# Patient Record
Sex: MALE | Race: WHITE | URBAN - METROPOLITAN AREA
[De-identification: names, ages, dates, MRNs, and addresses within clinical notes are randomized per-mention and may not be internally consistent; named-entity substitution may affect disease eponyms.]

---

## 2018-03-01 ENCOUNTER — OFFICE VISIT (OUTPATIENT)
Dept: PSYCHIATRY | Facility: CLINIC | Age: 23
End: 2018-03-01
Attending: PSYCHOLOGIST
Payer: COMMERCIAL

## 2018-03-01 DIAGNOSIS — F41.0 PANIC DISORDER WITHOUT AGORAPHOBIA: Primary | ICD-10-CM

## 2018-03-01 DIAGNOSIS — F33.0 MAJOR DEPRESSIVE DISORDER, RECURRENT EPISODE, MILD (H): ICD-10-CM
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
## 2018-03-01 NOTE — MR AVS SNAPSHOT
After Visit Summary   3/1/2018    Shar Atkinson    MRN: [id number]           Patient Information     Date Of Birth          1995        Visit Information        Provider Department      3/1/2018 11:00 AM Sara Wong, PhD [facility]        Today's Diagnoses     Panic disorder without agoraphobia    -  1    Major depressive disorder, recurrent episode, mild (H)           Follow-ups after your visit        Your next 10 appointments already scheduled     Mar 08, 2018  3:00 PM CST   Adult Psychotherapy with Sara Byers, PhD   Psychiatry Clinic ([facility])    [address]  [address] [facility] [address] [phone]              Who to contact     Please call your clinic at [phone] to:    Ask questions about your health    Make or cancel appointments    Discuss your medicines    Learn about your test results    Speak to your doctor            Additional Information About Your Visit        MyChart Information     [employer] is an electronic gateway that provides easy, online access to your medical records. With [employer], you can request a clinic appointment, read your test results, renew a prescription or communicate with your care team.     To sign up for [employer] visit the website at [email or web address]   You will be asked to enter the access code listed below, as well as some personal information. Please follow the directions to create your username and password.     Your access code is: [email or web address]  Expires: 2018 12:23 PM     Your access code will  in 90 days. If you need help or a new code, please contact your [facility] or call [phone] for assistance.        Care EveryWhere ID     This is your Care EveryWhere ID. This could be used by other organizations to access your [facility] medical records  [id number]         Blood Pressure from Last 3 Encounters:   No data  found for BP    Weight from Last 3 Encounters:   No data found for Wt              Today, you had the following     No orders found for display       Primary Care Provider Office Phone # Fax #    Robert D Behrend, [phone]       [facility] [address]  [place] [address]        Equal Access to Services     [facility]: Hadii aad ku hadasho Soomaali, waaxda luqadaha, qaybta kaalmada adeegyada, waxay idiin hayaan adeeg kharash la'aan . So [facility] [phone].    ATENCIÓN: Si habla español, tiene a cedeno disposición servicios gratuitos de asistencia lingüística. Llame al [phone].    We comply with applicable federal civil rights laws and [place] laws. We do not discriminate on the basis of race, color, national origin, age, disability, sex, sexual orientation, or gender identity.            Thank you!     Thank you for choosing PS[facility] CLINIC  for your care. Our goal is always to provide you with excellent care. Hearing back from our patients is one way we can continue to improve our services. Please take a few minutes to complete the written survey that you may receive in the mail after your visit with us. Thank you!             Your Updated Medication List - Protect others around you: Learn how to safely use, store and throw away your medicines at www.disposemymeds.org.      Notice  As of 3/1/2018 12:23 PM    You have not been prescribed any medications.

## 2018-03-22 ENCOUNTER — OFFICE VISIT (OUTPATIENT)
Dept: PSYCHIATRY | Facility: CLINIC | Age: 23
End: 2018-03-22
Attending: NURSE PRACTITIONER
Payer: COMMERCIAL

## 2018-03-22 VITALS — SYSTOLIC BLOOD PRESSURE: 121 MMHG | WEIGHT: 157.6 LBS | HEART RATE: 68 BPM | DIASTOLIC BLOOD PRESSURE: 70 MMHG

## 2018-03-22 DIAGNOSIS — F41.1 GAD (GENERALIZED ANXIETY DISORDER): Primary | ICD-10-CM

## 2018-03-22 DIAGNOSIS — F33.1 MAJOR DEPRESSIVE DISORDER, RECURRENT EPISODE, MODERATE (H): ICD-10-CM

## 2018-03-22 PROCEDURE — G0463 HOSPITAL OUTPT CLINIC VISIT: HCPCS | Mod: ZF

## 2018-03-22 RX ORDER — PROPRANOLOL HYDROCHLORIDE 10 MG/1
TABLET ORAL
Qty: 90 TABLET | Refills: 0 | Status: SHIPPED | OUTPATIENT
Start: 2018-03-22 | End: 2018-04-24

## 2018-03-22 RX ORDER — ALBUTEROL SULFATE 90 UG/1
AEROSOL, METERED RESPIRATORY (INHALATION)
Refills: 0 | COMMUNITY
Start: 2018-03-22

## 2018-03-22 ASSESSMENT — PAIN SCALES - GENERAL: PAINLEVEL: NO PAIN (0)
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
## 2018-03-22 NOTE — NURSING NOTE
Chief Complaint   Patient presents with     Eval/Assessment     Panic disorder without agoraphobia      Reviewed allergies, smoking status, and pharmacy preference  Administered abuse screening questions   Obtained weight, blood pressure and heart rate

## 2018-03-22 NOTE — MR AVS SNAPSHOT
After Visit Summary   3/22/2018    Shar Atkinson    MRN: [id number]           Patient Information     Date Of Birth          1995        Visit Information        Provider Department      3/22/2018 9:30 AM Diego Parikh APRN CNP Psychiatry Clinic        Today's Diagnoses     IESHA (generalized anxiety disorder)    -  1    Major depressive disorder, recurrent episode, moderate (H)           Follow-ups after your visit        Your next 10 appointments already scheduled     Mar 28, 2018  4:00 PM CDT   Adult Psychotherapy with Sara Byers, PhD   Psychiatry Clinic ([facility])    [place] [id number] [address] [phone]            2018 10:00 AM CDT   Adult Med Follow UP with JOMAR Willis CNP   Psychiatry Clinic ([facility])    [place] [id number] [address] [phone]              Who to contact     Please call your clinic at [phone] to:    Ask questions about your health    Make or cancel appointments    Discuss your medicines    Learn about your test results    Speak to your doctor            Additional Information About Your Visit        MyChart Information     [employer] is an electronic gateway that provides easy, online access to your medical records. With evidanza, you can request a clinic appointment, read your test results, renew a prescription or communicate with your care team.     To sign up for [employer] visit the website at [email or web address]   You will be asked to enter the access code listed below, as well as some personal information. Please follow the directions to create your username and password.     Your access code is: [email or web address]  Expires: 2018  1:23 PM     Your access code will  in 90 days. If you need help or a new code, please contact your [facility] or call  [phone] for assistance.        Care EveryWhere ID     This is your Care EveryWhere ID. This could be used by other organizations to access your [facility] medical records  [id number]        Your Vitals Were     Pulse                   68            Blood Pressure from Last 3 Encounters:   03/22/18 121/70    Weight from Last 3 Encounters:   03/22/18 71.5 kg (157 lb 9.6 oz)              Today, you had the following     No orders found for display         Today's Medication Changes          These changes are accurate as of 3/22/18 11:59 PM.  If you have any questions, ask your nurse or doctor.               Start taking these medicines.        Dose/Directions    propranolol 10 MG tablet   Commonly known as:  INDERAL   Used for:  IESHA (generalized anxiety disorder)   Started by:  Diego Parikh APRN CNP        Take 1 tablet (10mg) up to three times per day as needed for performance anxiety   Quantity:  90 tablet   Refills:  0       sertraline 50 MG tablet   Commonly known as:  ZOLOFT   Used for:  Major depressive disorder, recurrent episode, moderate (H)   Started by:  Diego Parikh APRN CNP        Take 1/2 tablet (25mg) for 4-7 days then increase to 1 tablet (50mg) daily   Quantity:  30 tablet   Refills:  0            Where to get your medicines      These medications were sent to [facility]/pharmacy #[id number] - [place], [place]  [address] [facility]  [address] [place] [address]     Phone:  [phone]     propranolol 10 MG tablet    sertraline 50 MG tablet                Primary Care Provider Office Phone # Fax #    Robert D Behrend, [phone]       [facility] [address]  [place] [address]        Equal Access to Services     : Hadii jayleen Pang, waaxda ludeloris, qaybta kaaljostin burnett, med evans. So [facility] [phone].    ATENCIÓN: Si habla español, tiene a cedeno disposición servicios gratuitos  de asistencia lingüística. Rohit stein [phone].    We comply with applicable federal civil rights laws and [place] laws. We do not discriminate on the basis of race, color, national origin, age, disability, sex, sexual orientation, or gender identity.            Thank you!     Thank you for choosing PSYCHIATRY CLINIC  for your care. Our goal is always to provide you with excellent care. Hearing back from our patients is one way we can continue to improve our services. Please take a few minutes to complete the written survey that you may receive in the mail after your visit with us. Thank you!             Your Updated Medication List - Protect others around you: Learn how to safely use, store and throw away your medicines at www.disposemymeds.org.          This list is accurate as of 3/22/18 11:59 PM.  Always use your most recent med list.                   Brand Name Dispense Instructions for use Diagnosis    albuterol 108 (90 BASE) MCG/ACT Inhaler    PROAIR HFA/PROVENTIL HFA/VENTOLIN HFA          CONCERTA PO      Take 30 mg by mouth        propranolol 10 MG tablet    INDERAL    90 tablet    Take 1 tablet (10mg) up to three times per day as needed for performance anxiety    IESHA (generalized anxiety disorder)       sertraline 50 MG tablet    ZOLOFT    30 tablet    Take 1/2 tablet (25mg) for 4-7 days then increase to 1 tablet (50mg) daily    Major depressive disorder, recurrent episode, moderate (H)

## 2018-03-28 ENCOUNTER — OFFICE VISIT (OUTPATIENT)
Dept: PSYCHIATRY | Facility: CLINIC | Age: 23
End: 2018-03-28
Attending: PSYCHOLOGIST
Payer: COMMERCIAL

## 2018-03-28 DIAGNOSIS — F41.0 PANIC DISORDER WITHOUT AGORAPHOBIA: Primary | ICD-10-CM

## 2018-03-28 DIAGNOSIS — F33.1 MAJOR DEPRESSIVE DISORDER, RECURRENT EPISODE, MODERATE (H): ICD-10-CM
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
## 2018-03-28 NOTE — MR AVS SNAPSHOT
After Visit Summary   3/28/2018    Shar Atkinson    MRN: [id number]           Patient Information     Date Of Birth          1995        Visit Information        Provider Department      3/28/2018 4:00 PM Sara Wong, PhD Psychiatry Clinic        Today's Diagnoses     Panic disorder without agoraphobia    -  1    Major depressive disorder, recurrent episode, moderate (H)           Follow-ups after your visit        Your next 10 appointments already scheduled     2018  1:00 PM CDT   Adult Psychotherapy with Sara Byers, PhD   Psychiatry Clinic ([facility])    [place] [id number] [id number] [phone]            2018 10:00 AM CDT   Adult Med Follow UP with JOMAR Willis CNP   Psychiatry Clinic ([facility])    [place] [id number] [address] [phone]              Who to contact     Please call your clinic at [phone] to:    Ask questions about your health    Make or cancel appointments    Discuss your medicines    Learn about your test results    Speak to your doctor            Additional Information About Your Visit        MyChart Information     [employer] is an electronic gateway that provides easy, online access to your medical records. With Instagram, you can request a clinic appointment, read your test results, renew a prescription or communicate with your care team.     To sign up for [employer] visit the website at [email or web address]   You will be asked to enter the access code listed below, as well as some personal information. Please follow the directions to create your username and password.     Your access code is: [email or web address]  Expires: 2018  1:23 PM     Your access code will  in 90 days. If you need help or a new code, please contact your [facility] or call  [phone] for assistance.        Care EveryWhere ID     This is your Care EveryWhere ID. This could be used by other organizations to access your [facility] medical records  [id number]         Blood Pressure from Last 3 Encounters:   03/22/18 121/70    Weight from Last 3 Encounters:   03/22/18 71.5 kg (157 lb 9.6 oz)              Today, you had the following     No orders found for display       Primary Care Provider Office Phone # Fax #    Robert D Behrend, [phone]       INTERNAL MEDICINE CLINIC [address]  [place] [address]        Equal Access to Services     [facility]: Hadii aad ku hadasho Soomaali, waaxda luqadaha, qaybta kaalmada adeegyada, waxay idiin hayaan adeeg andria dixon . So [facility] [phone].    ATENCIÓN: Si habla español, tiene a cedeno disposición servicios gratuitos de asistencia lingüística. Llame al [phone].    We comply with applicable federal civil rights laws and [place] laws. We do not discriminate on the basis of race, color, national origin, age, disability, sex, sexual orientation, or gender identity.            Thank you!     Thank you for choosing PSYCHIATRY CLINIC  for your care. Our goal is always to provide you with excellent care. Hearing back from our patients is one way we can continue to improve our services. Please take a few minutes to complete the written survey that you may receive in the mail after your visit with us. Thank you!             Your Updated Medication List - Protect others around you: Learn how to safely use, store and throw away your medicines at www.disposemymeds.org.          This list is accurate as of 3/28/18  5:22 PM.  Always use your most recent med list.                   Brand Name Dispense Instructions for use Diagnosis    albuterol 108 (90 BASE) MCG/ACT Inhaler    PROAIR HFA/PROVENTIL HFA/VENTOLIN HFA          CONCERTA PO      Take 30 mg by mouth        propranolol 10 MG tablet    INDERAL    90 tablet     Take 1 tablet (10mg) up to three times per day as needed for performance anxiety    IESHA (generalized anxiety disorder)       sertraline 50 MG tablet    ZOLOFT    30 tablet    Take 1/2 tablet (25mg) for 4-7 days then increase to 1 tablet (50mg) daily    Major depressive disorder, recurrent episode, moderate (H)

## 2018-03-30 ASSESSMENT — PATIENT HEALTH QUESTIONNAIRE - PHQ9: SUM OF ALL RESPONSES TO PHQ QUESTIONS 1-9: 21

## 2018-04-05 ENCOUNTER — OFFICE VISIT (OUTPATIENT)
Dept: PSYCHIATRY | Facility: CLINIC | Age: 23
End: 2018-04-05
Attending: PSYCHOLOGIST
Payer: COMMERCIAL

## 2018-04-05 DIAGNOSIS — F41.0 PANIC DISORDER WITHOUT AGORAPHOBIA: Primary | ICD-10-CM

## 2018-04-05 DIAGNOSIS — F33.1 MAJOR DEPRESSIVE DISORDER, RECURRENT EPISODE, MODERATE (H): ICD-10-CM

## 2018-04-05 NOTE — PROGRESS NOTES
Therapy Notes  Provider: Sara Byers, PhD, LP  Starts: 1:02 pm-  1: 56 pm.      Objective: A arrived on time.      Subjective: A and I carefully described the triggers for his panic attacks. We agreed that A would initiate a conversation with a strange woman in a coffee shop and start a small talk with them. He will do this at least two times in the intervening week. He is going to do this in the [facility], this Saturday and Sunday. Possibly on a third day also.  A started working out and has felt better when he finish working out.    These are the triggers of his anxiety and the intensity of the fear.    1. Pre exam anxiety and right before the exam: 10  2. Arguments with friends: 7  3. Arguments with boyfriends: 8  4. Socializing:  - Friends that he knows well: 2-3  -Strangers, any time he meets anybody new: 7  -Spending time his fraternity: 6-7  5. Talking to people in charge  - TA's: 5  -Professors: 6-7.     Assessment: A is experiencing significant anxiety and depression particularly within the context of his relationships       Plan: Check on homework re conversation with his boyfriend.    Diagnosis    Panic Disorder without Agoraphobia F41.0  MDD recurrent, mild F33.0

## 2018-04-12 ENCOUNTER — OFFICE VISIT (OUTPATIENT)
Dept: PSYCHIATRY | Facility: CLINIC | Age: 23
End: 2018-04-12
Attending: PSYCHOLOGIST
Payer: COMMERCIAL

## 2018-04-12 DIAGNOSIS — F32.0 MAJOR DEPRESSIVE DISORDER, SINGLE EPISODE, MILD (H): Primary | ICD-10-CM

## 2018-04-12 DIAGNOSIS — F40.10 SOCIAL ANXIETY DISORDER: ICD-10-CM

## 2018-04-12 NOTE — MR AVS SNAPSHOT
After Visit Summary   2018    Shar Atkinson    MRN: [id number]           Patient Information     Date Of Birth          1995        Visit Information        Provider Department      2018 10:00 AM Sara Wong, PhD Psychiatry Clinic        Today's Diagnoses     Major depressive disorder, single episode, mild (H)    -  1    Social anxiety disorder           Follow-ups after your visit        Your next 10 appointments already scheduled     2018  2:00 PM CDT   Adult Psychotherapy with Sara Byers, PhD   Psychiatry Clinic ([facility])    [place] [id number] [address] [address] [phone]            2018 10:00 AM CDT   Adult Med Follow UP with JOMAR Willis CNP   Psychiatry Clinic ([facility])    [place] [id number] [address] [address] [phone]              Who to contact     Please call your clinic at [phone] to:    Ask questions about your health    Make or cancel appointments    Discuss your medicines    Learn about your test results    Speak to your doctor            Additional Information About Your Visit        MyChart Information     Sage Sciencet is an electronic gateway that provides easy, online access to your medical records. With [employer], you can request a clinic appointment, read your test results, renew a prescription or communicate with your care team.     To sign up for Sage Sciencet visit the website at [email or web address]   You will be asked to enter the access code listed below, as well as some personal information. Please follow the directions to create your username and password.     Your access code is: [email or web address]  Expires: 2018  1:23 PM     Your access code will  in 90 days. If you need help or a new code, please contact your [facility] or call [phone] for  assistance.        Care EveryWhere ID     This is your Care EveryWhere ID. This could be used by other organizations to access your [facility] medical records  [id number]         Blood Pressure from Last 3 Encounters:   03/22/18 121/70    Weight from Last 3 Encounters:   03/22/18 71.5 kg (157 lb 9.6 oz)              Today, you had the following     No orders found for display       Primary Care Provider Office Phone # Fax #    Robert D Behrend, [phone]       INTERNAL MEDICINE CLINIC [address]  [place] [address]        Equal Access to Services     [facility]: Hadii aad ku hadasho Soomaali, waaxda luqadaha, qaybta kaalmada adeegyada, med lucasin hayaan adeeg andria dixon . So [facility] [phone].    ATENCIÓN: Si habla español, tiene a cedeno disposición servicios gratuitos de asistencia lingüística. [facility] [phone].    We comply with applicable federal civil rights laws and [place] laws. We do not discriminate on the basis of race, color, national origin, age, disability, sex, sexual orientation, or gender identity.            Thank you!     Thank you for choosing PSYCHIATRY CLINIC  for your care. Our goal is always to provide you with excellent care. Hearing back from our patients is one way we can continue to improve our services. Please take a few minutes to complete the written survey that you may receive in the mail after your visit with us. Thank you!             Your Updated Medication List - Protect others around you: Learn how to safely use, store and throw away your medicines at www.disposemymeds.org.          This list is accurate as of 4/12/18 11:12 AM.  Always use your most recent med list.                   Brand Name Dispense Instructions for use Diagnosis    albuterol 108 (90 Base) MCG/ACT Inhaler    PROAIR HFA/PROVENTIL HFA/VENTOLIN HFA          CONCERTA PO      Take 30 mg by mouth        propranolol 10 MG tablet    INDERAL    90 tablet    Take 1 tablet  (10mg) up to three times per day as needed for performance anxiety    IESHA (generalized anxiety disorder)       sertraline 50 MG tablet    ZOLOFT    30 tablet    Take 1/2 tablet (25mg) for 4-7 days then increase to 1 tablet (50mg) daily    Major depressive disorder, recurrent episode, moderate (H)

## 2018-04-12 NOTE — PROGRESS NOTES
Therapy Notes  Provider: Sara Byers, PhD, LP  Starts: 10:00 am-10:53 am      Objective: A arrived on time.      Subjective: A  Was able to engage in a conversation with a stranger in the library, however this was not really a conversation that fit our agreement to expose himself to a conversation with a stranger. A and I talked about the risk factors for anxiety disorders. We also discussed the reasons for exposure therapy and then how could he overcome his resistance to expose himself to a conversation with a stranger. I carefully and consistently explained the limitations of not completing his homework and how his anxiety will not get better if he did not complete the homework.    These are the triggers of his anxiety and the intensity of the fear.     1. Pre exam anxiety and right before the exam: 10  2. Arguments with friends: 7  3. Arguments with boyfriends: 8  4. Socializing:  - Friends that he knows well: 2-3  -Strangers, any time he meets anybody new: 7  -Spending time his fraternity: 6-7  -Talking to people in charge 5.  - TA's: 5  -Professors: 6-7.     Assessment: A is experiencing significant social anxiety but is currently having difficulties following the prescribed exposure to conversations with strangers.      Plan: Check on homework re exposure to target social anxiety.     Diagnosis  Social Anxiety disorder F40.10     MDD recurrent, mild F33.0

## 2018-04-19 ENCOUNTER — OFFICE VISIT (OUTPATIENT)
Dept: PSYCHIATRY | Facility: CLINIC | Age: 23
End: 2018-04-19
Attending: PSYCHOLOGIST
Payer: COMMERCIAL

## 2018-04-19 DIAGNOSIS — F32.0 MAJOR DEPRESSIVE DISORDER, SINGLE EPISODE, MILD (H): ICD-10-CM

## 2018-04-19 DIAGNOSIS — F40.10 SOCIAL ANXIETY DISORDER: Primary | ICD-10-CM

## 2018-04-19 NOTE — PROGRESS NOTES
Therapy Notes  Provider: Sara Byers, PhD, LP  Starts: 2:00 am-2:53 am      Objective: A arrived on time.      Subjective: A reported that he has been having a busy week and also having a difficult week in general. Specifically, his father relapse into alcohol use and he failed an O-Chem test. A was able to talk to strangers in multiple occasions including [employer], he also went to the Library. He was able to talk to one stranger in one of the lines at  [employer]. A will also purposefully talk to one of his TA's at the  tomorrow.      1. Pre exam anxiety and right before the exam: 10  2. Arguments with friends: 7  3. Arguments with boyfriends: 8  4. 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  - TA's: 5  -Professors: 6-7.      Assessment: A is experiencing significant social anxiety but is currently having difficulties following the prescribed exposure to conversations with strangers.      Plan: Check on homework re exposure to target social anxiety.      Diagnosis  Social Anxiety disorder F40.10  MDD recurrent, mild F33.0

## 2018-04-19 NOTE — MR AVS SNAPSHOT
After Visit Summary   2018    Shar Atkinson    MRN: [id number]           Patient Information     Date Of Birth          1995        Visit Information        Provider Department      2018 2:00 PM Sara Wong, PhD Psychiatry Clinic        Today's Diagnoses     Social anxiety disorder    -  1    Major depressive disorder, single episode, mild (H)           Follow-ups after your visit        Your next 10 appointments already scheduled     2018 10:00 AM CDT   Adult Med Follow UP with JOMAR Willis CNP   Psychiatry Clinic ([facility])    [place] [id number]  [address] [place] [address] [phone]            2018 11:00 AM CDT   Adult Psychotherapy with Sara Byers, PhD   Psychiatry Clinic ([facility])    [place] [id number]  [facility] [address] [address] [phone]              Who to contact     Please call your clinic at [phone] to:    Ask questions about your health    Make or cancel appointments    Discuss your medicines    Learn about your test results    Speak to your doctor            Additional Information About Your Visit        MyChart Information     [employer] is an electronic gateway that provides easy, online access to your medical records. With [employer], you can request a clinic appointment, read your test results, renew a prescription or communicate with your care team.     To sign up for [employer] visit the website at [email or web address]   You will be asked to enter the access code listed below, as well as some personal information. Please follow the directions to create your username and password.     Your access code is: [email or web address]  Expires: 2018  1:23 PM     Your access code will  in 90 days. If you need help or a new code, please contact your [facility] or call [phone] for  assistance.        Care EveryWhere ID     This is your Care EveryWhere ID. This could be used by other organizations to access your [facility] medical records  [id number]         Blood Pressure from Last 3 Encounters:   03/22/18 121/70    Weight from Last 3 Encounters:   03/22/18 71.5 kg (157 lb 9.6 oz)              Today, you had the following     No orders found for display       Primary Care Provider Office Phone # Fax #    Robert D Behrend, [phone]       INTERNAL MEDICINE CLINIC [address]  [place] [address]        Equal Access to Services     [facility]: Hadii aad ku hadasho Soomaali, waaxda luqadaha, qaybta kaalmada adeegyada, med lucasin hayaan adeeg andria dixon . So [facility] [phone].    ATENCIÓN: Si habla español, tiene a cedeno disposición servicios gratuitos de asistencia lingüística. [facility] [phone].    We comply with applicable federal civil rights laws and [place] laws. We do not discriminate on the basis of race, color, national origin, age, disability, sex, sexual orientation, or gender identity.            Thank you!     Thank you for choosing PSYCHIATRY CLINIC  for your care. Our goal is always to provide you with excellent care. Hearing back from our patients is one way we can continue to improve our services. Please take a few minutes to complete the written survey that you may receive in the mail after your visit with us. Thank you!             Your Updated Medication List - Protect others around you: Learn how to safely use, store and throw away your medicines at www.disposemymeds.org.          This list is accurate as of 4/19/18  2:54 PM.  Always use your most recent med list.                   Brand Name Dispense Instructions for use Diagnosis    albuterol 108 (90 Base) MCG/ACT Inhaler    PROAIR HFA/PROVENTIL HFA/VENTOLIN HFA          CONCERTA PO      Take 30 mg by mouth        propranolol 10 MG tablet    INDERAL    90 tablet    Take 1 tablet  (10mg) up to three times per day as needed for performance anxiety    IESHA (generalized anxiety disorder)       sertraline 50 MG tablet    ZOLOFT    30 tablet    Take 1/2 tablet (25mg) for 4-7 days then increase to 1 tablet (50mg) daily    Major depressive disorder, recurrent episode, moderate (H)

## 2018-04-24 ENCOUNTER — OFFICE VISIT (OUTPATIENT)
Dept: PSYCHIATRY | Facility: CLINIC | Age: 23
End: 2018-04-24
Attending: NURSE PRACTITIONER
Payer: COMMERCIAL

## 2018-04-24 VITALS — WEIGHT: 153 LBS | HEART RATE: 76 BPM | SYSTOLIC BLOOD PRESSURE: 138 MMHG | DIASTOLIC BLOOD PRESSURE: 84 MMHG

## 2018-04-24 DIAGNOSIS — F33.1 MAJOR DEPRESSIVE DISORDER, RECURRENT EPISODE, MODERATE (H): ICD-10-CM

## 2018-04-24 DIAGNOSIS — F41.1 GAD (GENERALIZED ANXIETY DISORDER): ICD-10-CM

## 2018-04-24 PROCEDURE — G0463 HOSPITAL OUTPT CLINIC VISIT: HCPCS | Mod: ZF

## 2018-04-24 RX ORDER — SERTRALINE HYDROCHLORIDE 100 MG/1
100 TABLET, FILM COATED ORAL DAILY
Qty: 30 TABLET | Refills: 0 | Status: SHIPPED | OUTPATIENT
Start: 2018-04-24 | End: 2018-06-05

## 2018-04-24 RX ORDER — PROPRANOLOL HYDROCHLORIDE 10 MG/1
TABLET ORAL
COMMUNITY
Start: 2018-04-24 | End: 2018-11-08

## 2018-04-24 ASSESSMENT — PAIN SCALES - GENERAL: PAINLEVEL: NO PAIN (0)

## 2018-04-24 NOTE — PROGRESS NOTES
Psychiatry Clinic Progress Note                                                                   Shar Atkinson is a 22 year old male who returns to the clinic for follow-up care  Therapist: BRITTANIE Wong  PCP: Behrend, Robert D  Other Providers: None    Pertinent Background:  See previous notes.  Psych critical item history includes suicidal ideation.     Interim History                                                                                                        4, 4     The patient is a good historian, reports good treatment adherence and was last seen 3/22/18.  Since the last visit, Shar reports he is doing better.  He states the change in weather has helped his mood.  Reports Sertraline is helpful in managing both his depression and anxiety.  He still reports some concerns regarding anxiety.  School and family continue to be stressors.  Shar reports his father recently relapsed and is receiving care.  His sister continues to be sober.  Shar continues to see his therapist weekly.  He is taking Propranolol but is not noticing much change.  He does not endorse dizziness, syncope, or excessive sedation.  He reports sleep has been issue during the past 2 weeks.      Recent Symptoms:   Depression:  depressed mood, anhedonia, low energy, insomnia, appetite changes and poor concentration /memory  Elevated:  none  Psychosis:  none  Anxiety:  excessive worry, social anxiety and nervous/overwhelmed  Panic Attack:  none  Trauma Related:  none     Recent Substance Use:  no changes reported        Social/ Family History                                  [per patient report]                                 1ea,1ea   FINANCIAL SUPPORT- Full-time student at the [employer]       FEELS SAFE AT HOME- Yes    Medical / Surgical History                                                                                                                There is no problem list on file for this patient.      No past surgical  history on file.     Medical Review of Systems                                                                                                    2,10   A comprehensive review of systems was performed and is negative other than noted in the HPI.  SVT (hx).  Asthma.  Allergy                                Review of patient's allergies indicates not on file.  Current Medications                                                                                                       Current Outpatient Prescriptions   Medication Sig Dispense Refill     albuterol (PROAIR HFA/PROVENTIL HFA/VENTOLIN HFA) 108 (90 BASE) MCG/ACT Inhaler   0     Methylphenidate HCl (CONCERTA PO) Take 30 mg by mouth       propranolol (INDERAL) 10 MG tablet Take 1 tablet (10mg) up to three times per day as needed for performance anxiety 90 tablet 0     sertraline (ZOLOFT) 50 MG tablet Take 1/2 tablet (25mg) for 4-7 days then increase to 1 tablet (50mg) daily 30 tablet 0     Vitals                                                                                                                       3, 3   /84  Pulse 76  Wt 69.4 kg (153 lb)   Mental Status Exam                                                                                    9, 14 cog gs     Alertness: alert  and oriented  Appearance: casually groomed  Behavior/Demeanor: cooperative and pleasant, with good  eye contact   Speech: normal and regular rate and rhythm  Language: good  Psychomotor: normal or unremarkable  Mood: description consistent with euthymia  Affect: full range; was congruent to mood; was congruent to content  Thought Process/Associations: unremarkable  Thought Content:  Reports none;  Denies suicidal ideation, violent ideation, delusions and paranoid ideation  Perception:  Reports none;  Denies auditory hallucinations and visual hallucinations  Insight: good  Judgment: good  Cognition: (6) does  appear grossly intact; formal cognitive testing was not  done  Gait/Station and/or Muscle Strength/Tone: unremarkable    Labs and Data                                                                                                                 Rating Scales:    PHQ9    PHQ9 Today:  14  PHQ-9 SCORE 3/22/2018   Total Score 21         Diagnosis and Assessment                                                                             m2, h3     Today the following issues were addressed:    1) Major Depressive Disorder, recurrent, moderate  2) Generalized Anxiety Disorder  3) R/O PTSD     [place] Prescription Monitoring Program [] was checked today:  indicates methylphenidate 30mg filled regularly .     PSYCHOTROPIC DRUG INTERACTIONS: Methylphenidate-Sertraline: Concurrent use of METHYLPHENIDATE and SELECTIVE SEROTONIN REUPTAKE INHIBITORS may result in increased selective serotonin reuptake inhibitor plasma concentrations.  Propranolol-Sertraline: Concurrent use of PROPRANOLOL and SERTRALINE may result in an increased risk of chest pain. .    Plan                                                                                                                    m2, h3      1) Management of mood and anxiety  Therapy- Continue  Medications-   Start Sertraline 50mg daily  Start Propranolol 10mg TID PRN for performance anxiety     2) Attention management  Medications-   Continue Methylphenidate 30mg         RTC: 1 month  CRISIS NUMBERS:   Provided routinely in AVS.    Treatment Risk Statement:  The patient understands the risks, benefits, adverse effects and alternatives. Agrees to treatment with the capacity to do so. No medical contraindications to treatment. Agrees to call clinic for any problems. The patient understands to call 911 or go to the nearest ED if life threatening or urgent symptoms occur.     PROVIDER:  JOMAR Palm CNP

## 2018-04-24 NOTE — MR AVS SNAPSHOT
After Visit Summary   2018    Shar Atkinson    MRN: [id number]           Patient Information     Date Of Birth          1995        Visit Information        Provider Department      2018 10:00 AM Diego Parikh APRN CNP Psychiatry Clinic        Today's Diagnoses     Major depressive disorder, recurrent episode, moderate (H)        IESHA (generalized anxiety disorder)           Follow-ups after your visit        Your next 10 appointments already scheduled     2018 11:00 AM CDT   Adult Psychotherapy with Sara Byers, PhD   Psychiatry Clinic ([facility])    [place] [id number]  [address] [place] [address] [phone]            May 24, 2018 11:00 AM CDT   Adult Med Follow UP with JOMAR Willis CNP   Psychiatry Clinic ([facility])    [place] [id number] [address] [address] [phone]              Who to contact     Please call your clinic at [phone] to:    Ask questions about your health    Make or cancel appointments    Discuss your medicines    Learn about your test results    Speak to your doctor            Additional Information About Your Visit        MyChart Information     [employer] is an electronic gateway that provides easy, online access to your medical records. With "[employer] you can request a clinic appointment, read your test results, renew a prescription or communicate with your care team.     To sign up for [employer] visit the website at [email or web address]   You will be asked to enter the access code listed below, as well as some personal information. Please follow the directions to create your username and password.     Your access code is: [email or web address]  Expires: 2018  1:23 PM     Your access code will  in 90 days. If you need help or a new code, please contact your [facility] or call  [phone] for assistance.        Care EveryWhere ID     This is your Care EveryWhere ID. This could be used by other organizations to access your [place] medical records  [id number]        Your Vitals Were     Pulse                   76            Blood Pressure from Last 3 Encounters:   04/24/18 138/84   03/22/18 121/70    Weight from Last 3 Encounters:   04/24/18 69.4 kg (153 lb)   03/22/18 71.5 kg (157 lb 9.6 oz)              Today, you had the following     No orders found for display         Today's Medication Changes          These changes are accurate as of 4/24/18 10:48 AM.  If you have any questions, ask your nurse or doctor.               These medicines have changed or have updated prescriptions.        Dose/Directions    propranolol 10 MG tablet   Commonly known as:  INDERAL   This may have changed:  additional instructions   Used for:  IESHA (generalized anxiety disorder)   Changed by:  Diego Parikh APRN CNP        Take 2 tablets (20mg) as needed for anxiety   Refills:  0       sertraline 100 MG tablet   Commonly known as:  ZOLOFT   This may have changed:    - medication strength  - how much to take  - how to take this  - when to take this  - additional instructions   Used for:  Major depressive disorder, recurrent episode, moderate (H)   Changed by:  Diego Parikh APRN CNP        Dose:  100 mg   Take 1 tablet (100 mg) by mouth daily   Quantity:  30 tablet   Refills:  0            Where to get your medicines      These medications were sent to [facility]/pharmacy #[id number] - [place], [place] - [address]  [address] [address]     Phone:  [phone]     sertraline 100 MG tablet                Primary Care Provider Office Phone # Fax #    Robert D Behrend, [phone]       INTERNAL MEDICINE CLINIC [address]  [place] [address]        Equal Access to Services     LADI GUIDO AH: Arminda Pang, ozzie fall, niko  med albrightjacquelin dixon ah. Khushbu [facility] [phone].    ATENCIÓN: Si truong joya, tiene a cedeno disposición servicios gratuitos de asistencia lingüística. Rohit al [phone].    We comply with applicable federal civil rights laws and [place] laws. We do not discriminate on the basis of race, color, national origin, age, disability, sex, sexual orientation, or gender identity.            Thank you!     Thank you for choosing PSYCHIATRY CLINIC  for your care. Our goal is always to provide you with excellent care. Hearing back from our patients is one way we can continue to improve our services. Please take a few minutes to complete the written survey that you may receive in the mail after your visit with us. Thank you!             Your Updated Medication List - Protect others around you: Learn how to safely use, store and throw away your medicines at www.disposemymeds.org.          This list is accurate as of 4/24/18 10:48 AM.  Always use your most recent med list.                   Brand Name Dispense Instructions for use Diagnosis    albuterol 108 (90 Base) MCG/ACT Inhaler    PROAIR HFA/PROVENTIL HFA/VENTOLIN HFA          CONCERTA PO      Take 30 mg by mouth        propranolol 10 MG tablet    INDERAL     Take 2 tablets (20mg) as needed for anxiety    IESHA (generalized anxiety disorder)       sertraline 100 MG tablet    ZOLOFT    30 tablet    Take 1 tablet (100 mg) by mouth daily    Major depressive disorder, recurrent episode, moderate (H)

## 2018-04-25 ASSESSMENT — PATIENT HEALTH QUESTIONNAIRE - PHQ9: SUM OF ALL RESPONSES TO PHQ QUESTIONS 1-9: 14

## 2018-04-26 ENCOUNTER — OFFICE VISIT (OUTPATIENT)
Dept: PSYCHIATRY | Facility: CLINIC | Age: 23
End: 2018-04-26
Attending: PSYCHOLOGIST
Payer: COMMERCIAL

## 2018-04-26 DIAGNOSIS — F40.10 SOCIAL ANXIETY DISORDER: Primary | ICD-10-CM

## 2018-04-26 NOTE — MR AVS SNAPSHOT
After Visit Summary   2018    Shar Atkinson    MRN: [id number]           Patient Information     Date Of Birth          1995        Visit Information        Provider Department      2018 11:00 AM Sara Wong, PhD [facility]        Today's Diagnoses     Social anxiety disorder    -  1       Follow-ups after your visit        Your next 10 appointments already scheduled     May 24, 2018 11:00 AM CDT   Adult Med Follow UP with JOMAR Willis CNP   Psychiatry Clinic ([facility])    [place] [id number]  [address] [id number] [phone]              Who to contact     Please call your clinic at [phone] to:    Ask questions about your health    Make or cancel appointments    Discuss your medicines    Learn about your test results    Speak to your doctor            Additional Information About Your Visit        MyChart Information     Apostrophe Appst is an electronic gateway that provides easy, online access to your medical records. With Taptica, you can request a clinic appointment, read your test results, renew a prescription or communicate with your care team.     To sign up for Apostrophe Appst visit the website at [email or web address]   You will be asked to enter the access code listed below, as well as some personal information. Please follow the directions to create your username and password.     Your access code is: [email or web address]  Expires: 2018  1:23 PM     Your access code will  in 90 days. If you need help or a new code, please contact your [facility] or call [phone] for assistance.        Care EveryWhere ID     This is your Care EveryWhere ID. This could be used by other organizations to access your [place] medical records  [id number]         Blood Pressure from Last 3 Encounters:   18 138/84   18 121/70    Weight from Last 3 Encounters:    04/24/18 69.4 kg (153 lb)   03/22/18 71.5 kg (157 lb 9.6 oz)              Today, you had the following     No orders found for display       Primary Care Provider Office Phone # Fax #    Robert D Behrend, [phone]       [facility] CLINIC [address]  [place] [address]        Equal Access to Services     [facility] : Hadii aad ku hadasho Soomaali, waaxda luqadaha, qaybta kaalmada adeegyada, waxay idiin hayaan adeeg kharash la'aan ah. So [facility] [place] [phone].    ATENCIÓN: Si truong joya, tiene a cedeno disposición servicios gratuitos de asistencia lingüística. Llame al [phone].    We comply with applicable federal civil rights laws and [place] laws. We do not discriminate on the basis of race, color, national origin, age, disability, sex, sexual orientation, or gender identity.            Thank you!     Thank you for choosing PSYCHIATRY CLINIC  for your care. Our goal is always to provide you with excellent care. Hearing back from our patients is one way we can continue to improve our services. Please take a few minutes to complete the written survey that you may receive in the mail after your visit with us. Thank you!             Your Updated Medication List - Protect others around you: Learn how to safely use, store and throw away your medicines at www.disposemymeds.org.          This list is accurate as of 4/26/18  3:01 PM.  Always use your most recent med list.                   Brand Name Dispense Instructions for use Diagnosis    albuterol 108 (90 Base) MCG/ACT Inhaler    PROAIR HFA/PROVENTIL HFA/VENTOLIN HFA          CONCERTA PO      Take 30 mg by mouth        propranolol 10 MG tablet    INDERAL     Take 2 tablets (20mg) as needed for anxiety    IESHA (generalized anxiety disorder)       sertraline 100 MG tablet    ZOLOFT    30 tablet    Take 1 tablet (100 mg) by mouth daily    Major depressive disorder, recurrent episode, moderate (H)

## 2018-04-26 NOTE — PROGRESS NOTES
Therapy Notes  Provider: Sara Byers, PhD, LP  Starts: 11:10 am- 12:09 pm       Objective: A arrived on time. He will call for a future appointment.      Subjective: A and I discussed his break up with his boyfriend and how to regulate emotions that he was avoiding.  We also discussed his tasks for social exposure and he fulfilled one of his homework tasks. A may not return until after the summer he may head home to [place].    We did not discussed the below goals for treatment, we spent the therapy session reviewing emotion regulation strategies to cope with loss of romantic relationship.    1. Pre exam anxiety and right before the exam: 10  2. Arguments with friends: 7  3. Arguments with boyfriends: 8  4. 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  - TA's: 5  -Professors: 6-7.      Assessment: STACY is experiencing significant social anxiety but is currently having difficulties following the prescribed exposure to conversations with strangers.      Plan: Check on homework re exposure to target social anxiety upon A's return.      Diagnosis  Social Anxiety disorder F40.10

## 2018-05-31 ENCOUNTER — TELEPHONE (OUTPATIENT)
Dept: PSYCHIATRY | Facility: CLINIC | Age: 23
End: 2018-05-31

## 2018-05-31 NOTE — TELEPHONE ENCOUNTER
Received RF request from [facility] pharmacy on [facility] for:    Sertraline 100 mg PO Q D    Last RF:  4/24/18    Due for RF:  yes    Appointment History:  Last appt: 4/24/18  RTC: 1 month  Cancel: none  No-show: one - 5/24/18  Next appt: none scheduled    Last clinic note indicated patient is to start sertraline 50 mg daily.  Will route to JOMAR Dolan, for confirmation of the dose and for authorization to refill since patient has a no show.    Sent message to scheduling to contact patient to sent up an appointment.

## 2018-06-05 ENCOUNTER — OFFICE VISIT (OUTPATIENT)
Dept: PSYCHIATRY | Facility: CLINIC | Age: 23
End: 2018-06-05
Attending: NURSE PRACTITIONER
Payer: COMMERCIAL

## 2018-06-05 VITALS — HEART RATE: 69 BPM | SYSTOLIC BLOOD PRESSURE: 122 MMHG | WEIGHT: 158.4 LBS | DIASTOLIC BLOOD PRESSURE: 76 MMHG

## 2018-06-05 DIAGNOSIS — F90.0 ATTENTION DEFICIT HYPERACTIVITY DISORDER (ADHD), PREDOMINANTLY INATTENTIVE TYPE: Primary | ICD-10-CM

## 2018-06-05 DIAGNOSIS — F33.1 MAJOR DEPRESSIVE DISORDER, RECURRENT EPISODE, MODERATE (H): ICD-10-CM

## 2018-06-05 DIAGNOSIS — F41.1 GAD (GENERALIZED ANXIETY DISORDER): ICD-10-CM

## 2018-06-05 PROCEDURE — G0463 HOSPITAL OUTPT CLINIC VISIT: HCPCS | Mod: ZF

## 2018-06-05 RX ORDER — METHYLPHENIDATE HYDROCHLORIDE 30 MG/1
30 CAPSULE, EXTENDED RELEASE ORAL DAILY
Qty: 30 CAPSULE | Refills: 0 | Status: SHIPPED | OUTPATIENT
Start: 2018-06-05 | End: 2018-12-02

## 2018-06-05 RX ORDER — SERTRALINE HYDROCHLORIDE 100 MG/1
100 TABLET, FILM COATED ORAL DAILY
Qty: 30 TABLET | Refills: 3 | Status: SHIPPED | OUTPATIENT
Start: 2018-06-05 | End: 2018-10-30

## 2018-06-05 ASSESSMENT — PAIN SCALES - GENERAL: PAINLEVEL: NO PAIN (0)

## 2018-06-05 NOTE — MR AVS SNAPSHOT
After Visit Summary   2018    Shar Atkinson    MRN: [id number]           Patient Information     Date Of Birth          1995        Visit Information        Provider Department      2018 11:00 AM Diego Parikh APRN [facility]        Today's Diagnoses     Attention deficit hyperactivity disorder (ADHD), predominantly inattentive type    -  1    IESHA (generalized anxiety disorder)        Major depressive disorder, recurrent episode, moderate (H)           Follow-ups after your visit        Who to contact     Please call your clinic at [phone] to:    Ask questions about your health    Make or cancel appointments    Discuss your medicines    Learn about your test results    Speak to your doctor            Additional Information About Your Visit        MyChart Information     Apixiot is an electronic gateway that provides easy, online access to your medical records. With GoodApril, you can request a clinic appointment, read your test results, renew a prescription or communicate with your care team.     To sign up for Apixiot visit the website at [email or web address]   You will be asked to enter the access code listed below, as well as some personal information. Please follow the directions to create your username and password.     Your access code is: [email or web address]  Expires: 9/15/2018  4:26 PM     Your access code will  in 90 days. If you need help or a new code, please contact your [facility] or call [phone] for assistance.        Care EveryWhere ID     This is your Care EveryWhere ID. This could be used by other organizations to access your [place] medical records  [id number]        Your Vitals Were     Pulse                   69            Blood Pressure from Last 3 Encounters:   18 122/76   18 138/84   18 121/70    Weight from Last 3 Encounters:   18 71.8 kg (158 lb 6.4 oz)   18 69.4 kg (153  lb)   03/22/18 71.5 kg (157 lb 9.6 oz)              Today, you had the following     No orders found for display         Today's Medication Changes          These changes are accurate as of 6/5/18 11:59 PM.  If you have any questions, ask your nurse or doctor.               Start taking these medicines.        Dose/Directions    Methylphenidate HCl ER (XR) 30 MG Cp24   Used for:  Attention deficit hyperactivity disorder (ADHD), predominantly inattentive type, IESHA (generalized anxiety disorder)   Started by:  Diego Parikh APRN CNP        Dose:  30 mg   Take 30 mg by mouth daily   Quantity:  30 capsule   Refills:  0            Where to get your medicines      These medications were sent to [place] [address] [place] [facility] [address]   [address] [facility] [address]     Phone:  [phone]     sertraline 100 MG tablet         Some of these will need a paper prescription and others can be bought over the counter.  Ask your nurse if you have questions.     Bring a paper prescription for each of these medications     Methylphenidate HCl ER (XR) 30 MG Cp24                Primary Care Provider Office Phone # Fax #    Robert D Behrend, [phone]       INTERNAL [facility] CLINIC [address]  [place] [address]        Equal Access to Services     LADI GUIDO AH: Hadii jayleen martinezo Sonadiya, waaxda luqadaha, qaybta kaalmada adeegyada, med evans. So [facility] [phone].    ATENCIÓN: Si habla español, tiene a cedeno disposición servicios gratuitos de asistencia lingüística. Llame al [phone].    We comply with applicable federal civil rights laws and [place] laws. We do not discriminate on the basis of race, color, national origin, age, disability, sex, sexual orientation, or gender identity.            Thank you!     Thank you for choosing PSYCHIATRY CLINIC  for your care. Our goal is always to provide you with excellent care. Hearing  back from our patients is one way we can continue to improve our services. Please take a few minutes to complete the written survey that you may receive in the mail after your visit with us. Thank you!             Your Updated Medication List - Protect others around you: Learn how to safely use, store and throw away your medicines at www.disposemymeds.org.          This list is accurate as of 6/5/18 11:59 PM.  Always use your most recent med list.                   Brand Name Dispense Instructions for use Diagnosis    albuterol 108 (90 Base) MCG/ACT Inhaler    PROAIR HFA/PROVENTIL HFA/VENTOLIN HFA          Methylphenidate HCl ER (XR) 30 MG Cp24     30 capsule    Take 30 mg by mouth daily    Attention deficit hyperactivity disorder (ADHD), predominantly inattentive type, IESHA (generalized anxiety disorder)       propranolol 10 MG tablet    INDERAL     Take 2 tablets (20mg) as needed for anxiety    IESHA (generalized anxiety disorder)       sertraline 100 MG tablet    ZOLOFT    30 tablet    Take 1 tablet (100 mg) by mouth daily    Major depressive disorder, recurrent episode, moderate (H)

## 2018-06-05 NOTE — NURSING NOTE
Chief Complaint   Patient presents with     Recheck Medication     Major depressive disorder, recurrent episode, moderate

## 2018-06-18 ENCOUNTER — TELEPHONE (OUTPATIENT)
Dept: PSYCHIATRY | Facility: CLINIC | Age: 23
End: 2018-06-18

## 2018-06-18 DIAGNOSIS — F90.0 ATTENTION DEFICIT HYPERACTIVITY DISORDER, INATTENTIVE TYPE: Primary | ICD-10-CM

## 2018-06-18 RX ORDER — METHYLPHENIDATE HYDROCHLORIDE 36 MG/1
36 TABLET, EXTENDED RELEASE ORAL DAILY
Qty: 30 TABLET | Refills: 0 | Status: SHIPPED | OUTPATIENT
Start: 2018-06-18 | End: 2018-08-17

## 2018-06-18 NOTE — TELEPHONE ENCOUNTER
----- Message from Manisha Sanders sent at 6/18/2018 12:57 PM CDT -----  Regarding: pt needs new script  Contact: [phone]  The pt is the caller. No pharmacies in his area carry 30mg methylphenidate, and he said that Diego would write him a script for 36mg if that is the case. He would like to p/u in clinic as soon as possible, since he only has 2-3 days of his medication left. Okay to leave a detailed vm.

## 2018-06-18 NOTE — TELEPHONE ENCOUNTER
Received a return call from patient.  He just wanted to know if he needed to bring the old Rx in because he left it at the [facility] on  [address].  Consulted with Diego, who said that it is okay not to bring in the old script.  Informed patient.

## 2018-06-18 NOTE — TELEPHONE ENCOUNTER
Diego Parikh, APRN Mireya Killian, RN        Caller: Unspecified (Today,  1:09 PM)                     Santos Gomes,     I'm ok with that         Entered order and forwarded paper script to Diego for signature.    Called patient and left VM advising him that script should be available tomorrow.  Requested that he bring in the old Rx for 30 mg to trade for the new one.

## 2018-06-18 NOTE — TELEPHONE ENCOUNTER
Patient would like to switch from Metadate CD to Concerta due to difficulty in obtaining Metadate.    Per , patient filled methylphenidate CD on the following dates: 4/20, 3/20, 1/29 in [place], nothing in 2018 in [place]    Will route to JOMAR Dolan, for recommendation re: request to switch to 36 mg

## 2018-06-19 ASSESSMENT — PATIENT HEALTH QUESTIONNAIRE - PHQ9: SUM OF ALL RESPONSES TO PHQ QUESTIONS 1-9: 6

## 2018-08-17 ENCOUNTER — TELEPHONE (OUTPATIENT)
Dept: PSYCHIATRY | Facility: CLINIC | Age: 23
End: 2018-08-17

## 2018-08-17 DIAGNOSIS — F90.0 ATTENTION DEFICIT HYPERACTIVITY DISORDER, INATTENTIVE TYPE: ICD-10-CM

## 2018-08-17 RX ORDER — METHYLPHENIDATE HYDROCHLORIDE 36 MG/1
36 TABLET, EXTENDED RELEASE ORAL DAILY
Qty: 30 TABLET | Refills: 0 | Status: SHIPPED | OUTPATIENT
Start: 2018-08-17 | End: 2018-12-02

## 2018-08-17 RX ORDER — METHYLPHENIDATE HYDROCHLORIDE 36 MG/1
36 TABLET, EXTENDED RELEASE ORAL DAILY
Qty: 30 TABLET | Refills: 0 | Status: SHIPPED | OUTPATIENT
Start: 2018-09-14 | End: 2018-12-02

## 2018-08-17 RX ORDER — METHYLPHENIDATE HYDROCHLORIDE 36 MG/1
36 TABLET, EXTENDED RELEASE ORAL DAILY
Qty: 30 TABLET | Refills: 0 | Status: SHIPPED | OUTPATIENT
Start: 2018-10-12 | End: 2018-11-08

## 2018-08-17 NOTE — TELEPHONE ENCOUNTER
Message  Received: Yesterday       Mai Henriquez Laura, RN       Phone Number: [phone]                     Kati/Keily     Caller:  patient   Relationship to pt: self   Medication:   methylphenadate - he is requesting a 3 month supply, if possible so he does not have to call every month for his refill.   How many days left of med do you have left (if >3 day supply, redirect to pharmacy): out - will  medication   Pharmacy and location:   Pending appt date:  None- did not want to schedule at this time   Okay to leave detailed VM:  yes

## 2018-08-17 NOTE — TELEPHONE ENCOUNTER
-Received signed scripts from provider. Tried calling pt's number from original message and under demographics section x3, but the phone number does not seem to be in service or is busy. Scripts placed at the  for pickup.

## 2018-08-17 NOTE — TELEPHONE ENCOUNTER
Last seen: 6/5/18  RTC: 3 months  Cancel: none  No-show: none  Next appt: none     Medication requested: Methylphenidate HCl ER 36 MG TB24- DAW  Directions: Take 1 tablet (36 mg) by mouth daily   Qty: 30  Last refilled: 6/19/18, 4/20/18, and 3/20/18 per [place]      Routed to provider due to pt's request for 3 month supply and pt should have been out of medication for almost one month

## 2018-08-17 NOTE — TELEPHONE ENCOUNTER
Message  Received: Today       Diego Parikh APRN CNP Pratt, Laura, RN       Caller: Unspecified (Today,  9:46 AM)                     I'm ok with 3 months.  I don't have concern with abuse or sale.  However, he will need to come in for future refills     Diego Brand printed three scripts per provider's approval. Placed in provider's folder and message routed to provider.

## 2018-10-30 ENCOUNTER — TELEPHONE (OUTPATIENT)
Dept: PSYCHIATRY | Facility: CLINIC | Age: 23
End: 2018-10-30

## 2018-10-30 DIAGNOSIS — F33.1 MAJOR DEPRESSIVE DISORDER, RECURRENT EPISODE, MODERATE (H): ICD-10-CM

## 2018-10-30 RX ORDER — SERTRALINE HYDROCHLORIDE 100 MG/1
100 TABLET, FILM COATED ORAL DAILY
Qty: 11 TABLET | Refills: 0 | Status: SHIPPED | OUTPATIENT
Start: 2018-10-30 | End: 2018-11-13

## 2018-10-30 NOTE — TELEPHONE ENCOUNTER
Last seen: 6/5/18  RTC: 3 months   Cancel: none  No-show: none  Next appt: not scheduled      Incoming refill from pt call.    Writer called pt at [phone] to schedule a follow-up appt. Pt reports being told by the provider that they did not need to follow-up for 1 year as the medications seemed to be working well.    Medication requested: sertraline (ZOLOFT) 100 MG tablet  Directions: Take 1 tablet (100 mg) by mouth daily - Oral  Qty: 30 tablet  Last refilled: Per med tab, 6/5/18 with 3 refills     Pt is requesting Zoloft refill, currently has 1 day left. Pt requested to have new Rx with multiple refills.     Pt requested additional Methylphenidate HCl ER 36mg tab refills for several months, pt requested to pick-up scripts in clinic next week if possible.    Medication requested: Methylphenidate HCl ER 36 MG 24H tablet  Directions: Take 1 tablet (36 mg) by mouth daily - Oral  Qty: 30 tablet  Last refilled: Per [place] , medication filled 10/1, 8/29, 6/19    Routed to provider for approval.

## 2018-10-30 NOTE — TELEPHONE ENCOUNTER
[facility] Call Center    Phone Message    May a detailed message be left on voicemail: yes    Reason for Call: Medication Refill Request    Has the patient contacted the pharmacy for the refill? Yes   Name of medication being requested: sertraline  Provider who prescribed the medication: Diego Parikh CNP  Pharmacy: [facility] in [facility]  Date medication is needed: the caller (Mariangel) was unsure      Action Taken: Message routed to:  Other: Madeline Landers RNCC

## 2018-10-30 NOTE — TELEPHONE ENCOUNTER
"Per provider response- \" authorize enough sertraline and methylphenidate to get him to appointment\"     Writer called and spoke with pt, follow-up appt scheduled for 11/8/18. Pt should have enough Methylphenidate to last through follow-up appointment.    Writer sent 11 day supply of Zoloft to pt's preferred pharmacy.   "

## 2018-11-01 ENCOUNTER — TELEPHONE (OUTPATIENT)
Dept: PSYCHIATRY | Facility: CLINIC | Age: 23
End: 2018-11-01

## 2018-11-01 NOTE — TELEPHONE ENCOUNTER
On 11/1/18 a Patient Safety and Health Consideration was received from [facility]. This write put the form in JOMAR Dolan CNP's folder and routed a message to Rahul Bradley LPN

## 2018-11-08 ENCOUNTER — OFFICE VISIT (OUTPATIENT)
Dept: PSYCHIATRY | Facility: CLINIC | Age: 23
End: 2018-11-08
Attending: NURSE PRACTITIONER
Payer: COMMERCIAL

## 2018-11-08 VITALS — HEART RATE: 74 BPM | SYSTOLIC BLOOD PRESSURE: 147 MMHG | WEIGHT: 160.6 LBS | DIASTOLIC BLOOD PRESSURE: 81 MMHG

## 2018-11-08 DIAGNOSIS — F41.1 GAD (GENERALIZED ANXIETY DISORDER): ICD-10-CM

## 2018-11-08 DIAGNOSIS — F90.0 ATTENTION DEFICIT HYPERACTIVITY DISORDER, INATTENTIVE TYPE: ICD-10-CM

## 2018-11-08 PROCEDURE — G0463 HOSPITAL OUTPT CLINIC VISIT: HCPCS | Mod: ZF

## 2018-11-08 RX ORDER — METHYLPHENIDATE HYDROCHLORIDE 36 MG/1
36 TABLET ORAL EVERY MORNING
Qty: 30 TABLET | Refills: 0 | Status: SHIPPED | OUTPATIENT
Start: 2018-12-08 | End: 2019-05-14

## 2018-11-08 RX ORDER — METHYLPHENIDATE HYDROCHLORIDE 36 MG/1
36 TABLET, EXTENDED RELEASE ORAL DAILY
Qty: 30 TABLET | Refills: 0 | Status: SHIPPED | OUTPATIENT
Start: 2018-11-08 | End: 2019-05-14

## 2018-11-08 RX ORDER — PROPRANOLOL HYDROCHLORIDE 10 MG/1
10 TABLET ORAL 2 TIMES DAILY
Qty: 90 TABLET | Refills: 0 | Status: SHIPPED | OUTPATIENT
Start: 2018-11-08 | End: 2018-11-08

## 2018-11-08 RX ORDER — METHYLPHENIDATE HYDROCHLORIDE 36 MG/1
36 TABLET ORAL EVERY MORNING
Qty: 30 TABLET | Refills: 0 | Status: SHIPPED | OUTPATIENT
Start: 2018-01-08 | End: 2019-01-31

## 2018-11-08 ASSESSMENT — PAIN SCALES - GENERAL: PAINLEVEL: NO PAIN (0)

## 2018-11-08 NOTE — PROGRESS NOTES
"  Psychiatry Clinic Progress Note                                                                   Shar Atkinson is a 23 year old male who returns to the clinic for follow-up care  Therapist: BRITTANIE Wong  PCP: Behrend, Robert D  Other Providers: None    Pertinent Background:  See previous notes.  Psych critical item history includes suicidal ideation.     Interim History                                                                                                        4, 4     The patient is a good historian, reports good treatment adherence and was last seen 6/5/18.  Since the last visit,  Shar reports that things are \"amazing...really, really good.\"  Currently in midst of midterms and managing stress effectively.  Depression is well managed by Zoloft.  No concerns with weight loss, increased anxiety, or sleep.    Even though Propranolol helpful, will hold due to history of asthma.  Shar will make appointment and W will attempt to contact PCP    6/5/18: Shar reports he successfully completed his sophomore year.  He plans on staying in [place] for summer.  Stopped therapy due to thinking he was not staying in [place] for summer.  He does not plan to continue as he is doing much better in regards to mental health. He does report that he has been sleeping more since school ended and due to not having a schedule.  Shar reports the Sertraline 100mg daily continues to be helpful in managing mood and anxiety.        4/24/18: Shar reports he is doing better.  He states the change in weather has helped his mood.  Reports Sertraline is helpful in managing both his depression and anxiety.  He still reports some concerns regarding anxiety.  School and family continue to be stressors.  Shar reports his father recently relapsed and is receiving care.  His sister continues to be sober.  Shar continues to see his therapist weekly.  He is taking Propranolol but is not noticing much change.  He does not endorse " dizziness, syncope, or excessive sedation.  He reports sleep has been issue during the past 2 weeks.      Recent Symptoms:   Depression:  depressed mood, appetite changes and poor concentration /memory  Elevated:  none  Psychosis:  none  Anxiety:  continues to endorse lessened anxiety  Panic Attack:  none  Trauma Related:  none     Recent Substance Use:  no changes reported        Social/ Family History                                  [per patient report]                                 1ea,1ea   FINANCIAL SUPPORT- Full-time student at the [employer]       FEELS SAFE AT HOME- Yes    Medical / Surgical History                                                                                                                There is no problem list on file for this patient.      No past surgical history on file.     Medical Review of Systems                                                                                                    2,10   A comprehensive review of systems was performed and is negative other than noted in the HPI.  SVT (hx).  Asthma.  Allergy                                Nuts; Peas; and Seafood  Current Medications                                                                                                       Current Outpatient Prescriptions   Medication Sig Dispense Refill     albuterol (PROAIR HFA/PROVENTIL HFA/VENTOLIN HFA) 108 (90 BASE) MCG/ACT Inhaler   0     Methylphenidate HCl ER 36 MG 24H tablet Take 1 tablet (36 mg) by mouth daily 30 tablet 0     Methylphenidate HCl ER 36 MG 24H tablet Take 1 tablet (36 mg) by mouth daily 30 tablet 0     Methylphenidate HCl ER 36 MG 24H tablet Take 1 tablet (36 mg) by mouth daily 30 tablet 0     Methylphenidate HCl ER, XR, 30 MG CP24 Take 30 mg by mouth daily 30 capsule 0     propranolol (INDERAL) 10 MG tablet Take 2 tablets (20mg) as needed for anxiety       sertraline (ZOLOFT) 100 MG tablet Take 1 tablet (100 mg) by mouth daily 11 tablet 0     Vitals                                                                                                                        3, 3   /81  Pulse 74  Wt 72.8 kg (160 lb 9.6 oz)   Mental Status Exam                                                                                    9, 14 cog gs     Alertness: alert  and oriented  Appearance: casually groomed  Behavior/Demeanor: cooperative, pleasant and calm, with good  eye contact   Speech: normal  Language: no obvious problem  Psychomotor: normal or unremarkable  Mood: description consistent with euthymia  Affect: full range; was congruent to mood; was congruent to content  Thought Process/Associations: unremarkable  Thought Content:  Reports none;  Denies suicidal ideation, violent ideation, delusions and paranoid ideation  Perception:  Reports none;  Denies auditory hallucinations and visual hallucinations  Insight: good  Judgment: good  Cognition: (6) does  appear grossly intact; formal cognitive testing was not done  Gait/Station and/or Muscle Strength/Tone: unremarkable    Labs and Data                                                                                                                 Rating Scales:    PHQ9    PHQ9 Today:  3  PHQ-9 SCORE 3/22/2018 4/24/2018 6/5/2018   Total Score 21 14 6         Diagnosis and Assessment                                                                             m2, h3     Today the following issues were addressed:    1) Major Depressive Disorder, recurrent, moderate  2) Generalized Anxiety Disorder  3) ADHD (Hx)     [place] Prescription Monitoring Program [] was checked today:  indicates methylphenidate 30mg filled regularly .     PSYCHOTROPIC DRUG INTERACTIONS: Methylphenidate-Sertraline: Concurrent use of METHYLPHENIDATE and SELECTIVE SEROTONIN REUPTAKE INHIBITORS may result in increased selective serotonin reuptake inhibitor plasma concentrations.  Propranolol-Sertraline: Concurrent use of PROPRANOLOL and SERTRALINE may  result in an increased risk of chest pain. .    Plan                                                                                                                    m2, h3      1) Management of mood and anxiety  Therapy- Continue  Medications-   Continue Sertraline 100mg daily  Disontinue Propranolol 20mg TID PRN for performance anxiety and periodic anxiety due to asthma diagnosis.  Shar will schedule appointment with PCP when home for holiday break.  If PCP is agreement, Propranolol may be restarted as it was helpful.     2) Attention management  Medications-   Continue Methylphenidate ER 36mg      RTC: 3 months  CRISIS NUMBERS:   Provided routinely in AVS.    Treatment Risk Statement:  The patient understands the risks, benefits, adverse effects and alternatives. Agrees to treatment with the capacity to do so. No medical contraindications to treatment. Agrees to call clinic for any problems. The patient understands to call 911 or go to the nearest ED if life threatening or urgent symptoms occur.     PROVIDER:  JOMAR Palm CNP

## 2018-11-08 NOTE — MR AVS SNAPSHOT
After Visit Summary   11/8/2018    Shar Atkinson    MRN: [id number]           Patient Information     Date Of Birth          1995        Visit Information        Provider Department      11/8/2018 3:00 PM Diego Parikh APRN [facility]        Today's Diagnoses     Attention deficit hyperactivity disorder, inattentive type        IESHA (generalized anxiety disorder)          Care Instructions    Thank you for coming to the PSYCHIATRY CLINIC.    Lab Testing:  If you had lab testing today and your results are reassuring or normal they will be mailed to you or sent through [employer] within 7 days.   If the lab tests need quick action we will call you with the results.  The phone number we will call with results is # [phone] (home) . If this is not the best number please call our clinic and change the number.    Medication Refills:  If you need any refills please call your pharmacy and they will contact us. Our fax number for refills is [phone]. Please allow three business for refill processing.   If you need to  your refill at a new pharmacy, please contact the new pharmacy directly. The new pharmacy will help you get your medications transferred.     Scheduling:  If you have any concerns about today's visit or wish to schedule another appointment please call our office during normal business hours [phone] (8-5:00 M-F)    Contact Us:  Please call [phone] during business hours (8-5:00 M-F).  If after clinic hours, or on the weekend, please call  [phone].    Financial Assistance [phone]  [employer] Billing [phone]  [employer] Billing [phone]  Medical Records [phone]      MENTAL HEALTH CRISIS NUMBERS:  [facility]:   [facility] - [phone]   Crisis Residence [facility] - [phone]   Walk-In Counseling Center [facility] - [phone]   COPE 24/7 [employer] Mobile Team for Adults - [[phone]]; Child -  [[phone]]     Crisis Connection - [phone]     [facility]:   [facility] - [phone]   Walk-in counseling [facility] - [phone]   Walk-in counseling [facility] - [phone]   Crisis Residence [facility] - [phone]   Urgent Care Adult Mental Health:   --Drop-in, 24/7 crisis line, and [place] Mobile Team [[phone]]    CRISIS TEXT LINE: Text [phone] from anywhere, anytime, any crisis 24/7;    OR SEE www.crisistextline.org     Poison Control Center - [phone]    CHILD: [facility] needs assessment team - [phone]     [facility] Lifeline - [phone]; or [employer] Lifeline - 1-237.711.5942    If you have a medical emergency please call 911or go to the nearest ER.                    _____________________________________________    Again thank you for choosing PSYCHIATRY CLINIC and please let us know how we can best partner with you to improve you and your family's health.  You may be receiving a survey in the mail regarding this appointment. We would love to have your feedback, both positive and negative, so please fill out the survey and return it using the provided envelope. The survey is done by an external company, so your answers are anonymous.             Follow-ups after your visit        Your next 10 appointments already scheduled     Feb 07, 2019  2:00 PM CST   Adult Med Follow UP with JOMAR Willis CNP   Psychiatry Clinic ([facility])    [place] [id number] [address] [phone]              Who to contact     Please call your clinic at [phone] to:    Ask questions about your health    Make or cancel appointments    Discuss your medicines    Learn about your test results    Speak to your doctor            Additional Information About Your Visit        Care EveryWhere ID     This is your Care EveryWhere ID. This could  be used by other organizations to access your [place] medical records  [id number]        Your Vitals Were     Pulse                   74            Blood Pressure from Last 3 Encounters:   11/08/18 147/81   06/05/18 122/76   04/24/18 138/84    Weight from Last 3 Encounters:   11/08/18 72.8 kg (160 lb 9.6 oz)   06/05/18 71.8 kg (158 lb 6.4 oz)   04/24/18 69.4 kg (153 lb)              Today, you had the following     No orders found for display         Today's Medication Changes          These changes are accurate as of 11/8/18  3:36 PM.  If you have any questions, ask your nurse or doctor.               These medicines have changed or have updated prescriptions.        Dose/Directions    * methylphenidate ER 36 MG CR tablet   Commonly known as:  CONCERTA   This may have changed:  You were already taking a medication with the same name, and this prescription was added. Make sure you understand how and when to take each.   Used for:  Attention deficit hyperactivity disorder, inattentive type   Changed by:  Diego Parikh APRN CNP        Dose:  36 mg   Take 1 tablet (36 mg) by mouth every morning   Quantity:  30 tablet   Refills:  0       * Methylphenidate HCl ER (XR) 30 MG Cp24   This may have changed:  Another medication with the same name was added. Make sure you understand how and when to take each.   Used for:  Attention deficit hyperactivity disorder (ADHD), predominantly inattentive type, IESHA (generalized anxiety disorder)   Changed by:  Diego Parikh APRN CNP        Dose:  30 mg   Take 30 mg by mouth daily   Quantity:  30 capsule   Refills:  0       * Methylphenidate HCl ER 36 MG 24H tablet   This may have changed:  Another medication with the same name was added. Make sure you understand how and when to take each.   Used for:  Attention deficit hyperactivity disorder, inattentive type   Changed by:  Diego Parikh APRN CNP        Dose:  36 mg   Take 1 tablet (36 mg) by mouth daily   Quantity:  30  tablet   Refills:  0       * Methylphenidate HCl ER 36 MG 24H tablet   This may have changed:  Another medication with the same name was added. Make sure you understand how and when to take each.   Used for:  Attention deficit hyperactivity disorder, inattentive type   Changed by:  Diego Parikh APRN CNP        Dose:  36 mg   Take 1 tablet (36 mg) by mouth daily   Quantity:  30 tablet   Refills:  0       * Methylphenidate HCl ER 36 MG 24H tablet   This may have changed:  Another medication with the same name was added. Make sure you understand how and when to take each.   Used for:  Attention deficit hyperactivity disorder, inattentive type   Changed by:  Diego Parikh APRN CNP        Dose:  36 mg   Take 1 tablet (36 mg) by mouth daily   Quantity:  30 tablet   Refills:  0       * methylphenidate ER 36 MG CR tablet   Commonly known as:  CONCERTA   This may have changed:  You were already taking a medication with the same name, and this prescription was added. Make sure you understand how and when to take each.   Used for:  Attention deficit hyperactivity disorder, inattentive type   Changed by:  Diego Parikh APRN CNP        Dose:  36 mg   Start taking on:  12/8/2018   Take 1 tablet (36 mg) by mouth every morning   Quantity:  30 tablet   Refills:  0       * Notice:  This list has 6 medication(s) that are the same as other medications prescribed for you. Read the directions carefully, and ask your doctor or other care provider to review them with you.      Stop taking these medicines if you haven't already. Please contact your care team if you have questions.     propranolol 10 MG tablet   Commonly known as:  INDERAL   Stopped by:  Diego Parikh APRN CNP                Where to get your medicines      Some of these will need a paper prescription and others can be bought over the counter.  Ask your nurse if you have questions.     Bring a paper prescription for each of these medications      methylphenidate ER 36 MG CR tablet    methylphenidate ER 36 MG CR tablet    Methylphenidate HCl ER 36 MG 24H tablet                Primary Care Provider Office Phone # Fax #    Robert D Behrend, [phone]       [facility] CL[facility] [address]  [place] [address]        Equal Access to Services     QUAN LATA : Hadii aad ku hadasho Soomaali, waaxda luqadaha, qaybta kaalmada adeegyada, waxay idiin hayaan adeeg khtrevinsh labolivarn . So [facility] [phone].    ATENCIÓN: Si habla español, tiene a cedeno disposición servicios gratuitos de asistencia lingüística. Rohit al [phone].    We comply with applicable federal civil rights laws and [place] laws. We do not discriminate on the basis of race, color, national origin, age, disability, sex, sexual orientation, or gender identity.            Thank you!     Thank you for choosing PSYCHIATRY CLINIC  for your care. Our goal is always to provide you with excellent care. Hearing back from our patients is one way we can continue to improve our services. Please take a few minutes to complete the written survey that you may receive in the mail after your visit with us. Thank you!             Your Updated Medication List - Protect others around you: Learn how to safely use, store and throw away your medicines at www.disposemymeds.org.          This list is accurate as of 11/8/18  3:36 PM.  Always use your most recent med list.                   Brand Name Dispense Instructions for use Diagnosis    albuterol 108 (90 Base) MCG/ACT inhaler    PROAIR HFA/PROVENTIL HFA/VENTOLIN HFA          * methylphenidate ER 36 MG CR tablet    CONCERTA    30 tablet    Take 1 tablet (36 mg) by mouth every morning    Attention deficit hyperactivity disorder, inattentive type       * Methylphenidate HCl ER (XR) 30 MG Cp24     30 capsule    Take 30 mg by mouth daily    Attention deficit hyperactivity disorder (ADHD), predominantly inattentive type, IESHA (generalized  anxiety disorder)       * Methylphenidate HCl ER 36 MG 24H tablet     30 tablet    Take 1 tablet (36 mg) by mouth daily    Attention deficit hyperactivity disorder, inattentive type       * Methylphenidate HCl ER 36 MG 24H tablet     30 tablet    Take 1 tablet (36 mg) by mouth daily    Attention deficit hyperactivity disorder, inattentive type       * Methylphenidate HCl ER 36 MG 24H tablet     30 tablet    Take 1 tablet (36 mg) by mouth daily    Attention deficit hyperactivity disorder, inattentive type       * methylphenidate ER 36 MG CR tablet   Start taking on:  12/8/2018    CONCERTA    30 tablet    Take 1 tablet (36 mg) by mouth every morning    Attention deficit hyperactivity disorder, inattentive type       sertraline 100 MG tablet    ZOLOFT    11 tablet    Take 1 tablet (100 mg) by mouth daily    Major depressive disorder, recurrent episode, moderate (H)       * Notice:  This list has 6 medication(s) that are the same as other medications prescribed for you. Read the directions carefully, and ask your doctor or other care provider to review them with you.

## 2018-11-08 NOTE — PATIENT INSTRUCTIONS
Thank you for coming to the PSYCHIATRY CLINIC.    Lab Testing:  If you had lab testing today and your results are reassuring or normal they will be mailed to you or sent through [employer] within 7 days.   If the lab tests need quick action we will call you with the results.  The phone number we will call with results is # [phone] (home) . If this is not the best number please call our clinic and change the number.    Medication Refills:  If you need any refills please call your pharmacy and they will contact us. Our fax number for refills is [phone]. Please allow three business for refill processing.   If you need to  your refill at a new pharmacy, please contact the new pharmacy directly. The new pharmacy will help you get your medications transferred.     Scheduling:  If you have any concerns about today's visit or wish to schedule another appointment please call our office during normal business hours [phone] (8-5:00 M-F)    Contact Us:  Please call [phone] during business hours (8-5:00 M-F).  If after clinic hours, or on the weekend, please call  [phone].    Financial Assistance [phone]  [employer] Billing [phone]  [employer] Billing [phone]  Medical Records [phone]      MENTAL HEALTH CRISIS NUMBERS:  [facility]:   [facility] - [phone]   Crisis Residence [facility] - [phone]   Walk-In Counseling [facility] [phone]   COPE 24/7 [place] Mobile Team for Adults - [[phone]]; Child - [[phone]]     Crisis Connection - [phone]     [facility]:   [facility] - [phone]   Walk-in counseling [facility] - [phone]   Walk-in counseling [facility] - [phone]   Crisis Residence [facility] - [phone]   Urgent Care Adult Mental Health:   --Drop-in, 24/7 crisis line, and [place] Mobile Team [[phone]]    CRISIS TEXT  LINE: Text 741-380 from anywhere, anytime, any crisis 24/7;    OR SEE www.crisistextline.org     Poison Control Center - [phone]    CHILD: [facility] needs assessment team - [phone]     [facility] - [phone]; or [employer] Lifeline - [phone]    If you have a medical emergency please call 911or go to the nearest ER.                    _____________________________________________    Again thank you for choosing PSYCHIATRY CLINIC and please let us know how we can best partner with you to improve you and your family's health.  You may be receiving a survey in the mail regarding this appointment. We would love to have your feedback, both positive and negative, so please fill out the survey and return it using the provided envelope. The survey is done by an external company, so your answers are anonymous.

## 2018-11-13 ENCOUNTER — TELEPHONE (OUTPATIENT)
Dept: PSYCHIATRY | Facility: CLINIC | Age: 23
End: 2018-11-13

## 2018-11-13 DIAGNOSIS — F33.1 MAJOR DEPRESSIVE DISORDER, RECURRENT EPISODE, MODERATE (H): ICD-10-CM

## 2018-11-13 RX ORDER — SERTRALINE HYDROCHLORIDE 100 MG/1
100 TABLET, FILM COATED ORAL DAILY
Qty: 30 TABLET | Refills: 2 | Status: SHIPPED | OUTPATIENT
Start: 2018-11-13 | End: 2019-02-01

## 2018-11-13 NOTE — TELEPHONE ENCOUNTER
Writer received verbal approval from provider for 90 day supply of Zoloft. Writer electronically sent 90 day supply to pt's preferred pharmacy. Writer called pt at [phone] and left message notifying pt of refill.

## 2018-11-13 NOTE — TELEPHONE ENCOUNTER
Last seen: 11/8/18  RTC: 3 months  Cancel: none  No-show: none  Next appt: 2/7/19     Incoming refill from pt call     Medication requested: sertraline (ZOLOFT) 100 MG tablet  Directions: Take 1 tablet (100 mg) by mouth daily - Oral  Qty: 11 tablet  Last refilled: 10/30/2018     Last visit note unsigned, routed to provider for approval.

## 2018-11-13 NOTE — TELEPHONE ENCOUNTER
On 11/8/2018 the patient signed a ERASTO to release records from [facility] to [facility]. No records needed just on file per JOMAR Dolan CNP. This writer sent the ERASTO to scanning and kept a copy in psychiatry until scanning is complete/confirmed. Any Morrison MA

## 2018-12-04 ASSESSMENT — PATIENT HEALTH QUESTIONNAIRE - PHQ9: SUM OF ALL RESPONSES TO PHQ QUESTIONS 1-9: 3

## 2019-01-31 ENCOUNTER — OFFICE VISIT (OUTPATIENT)
Dept: PSYCHIATRY | Facility: CLINIC | Age: 24
End: 2019-01-31
Attending: NURSE PRACTITIONER
Payer: COMMERCIAL

## 2019-01-31 VITALS — SYSTOLIC BLOOD PRESSURE: 128 MMHG | DIASTOLIC BLOOD PRESSURE: 83 MMHG | HEART RATE: 85 BPM | WEIGHT: 161.8 LBS

## 2019-01-31 DIAGNOSIS — F33.1 MAJOR DEPRESSIVE DISORDER, RECURRENT EPISODE, MODERATE (H): ICD-10-CM

## 2019-01-31 DIAGNOSIS — F90.0 ATTENTION DEFICIT HYPERACTIVITY DISORDER, INATTENTIVE TYPE: ICD-10-CM

## 2019-01-31 DIAGNOSIS — F41.1 GAD (GENERALIZED ANXIETY DISORDER): ICD-10-CM

## 2019-01-31 DIAGNOSIS — F39 MOOD DISORDER (H): Primary | ICD-10-CM

## 2019-01-31 LAB
ALBUMIN SERPL-MCNC: 4.2 G/DL (ref 3.4–5)
ALP SERPL-CCNC: 106 U/L (ref 40–150)
ALT SERPL W P-5'-P-CCNC: 26 U/L (ref 0–70)
ANION GAP SERPL CALCULATED.3IONS-SCNC: 7 MMOL/L (ref 3–14)
AST SERPL W P-5'-P-CCNC: 16 U/L (ref 0–45)
BILIRUB SERPL-MCNC: 0.9 MG/DL (ref 0.2–1.3)
BUN SERPL-MCNC: 9 MG/DL (ref 7–30)
CALCIUM SERPL-MCNC: 9 MG/DL (ref 8.5–10.1)
CHLORIDE SERPL-SCNC: 104 MMOL/L (ref 94–109)
CO2 SERPL-SCNC: 24 MMOL/L (ref 20–32)
CREAT SERPL-MCNC: 0.9 MG/DL (ref 0.66–1.25)
ERYTHROCYTE [DISTWIDTH] IN BLOOD BY AUTOMATED COUNT: 11.8 % (ref 10–15)
GFR SERPL CREATININE-BSD FRML MDRD: >90 ML/MIN/{1.73_M2}
GLUCOSE SERPL-MCNC: 85 MG/DL (ref 70–99)
HCT VFR BLD AUTO: 44.7 % (ref 40–53)
HGB BLD-MCNC: 15.6 G/DL (ref 13.3–17.7)
MCH RBC QN AUTO: 30.6 PG (ref 26.5–33)
MCHC RBC AUTO-ENTMCNC: 34.9 G/DL (ref 31.5–36.5)
MCV RBC AUTO: 88 FL (ref 78–100)
PLATELET # BLD AUTO: 238 10E9/L (ref 150–450)
POTASSIUM SERPL-SCNC: 4.3 MMOL/L (ref 3.4–5.3)
PROT SERPL-MCNC: 8.1 G/DL (ref 6.8–8.8)
RBC # BLD AUTO: 5.09 10E12/L (ref 4.4–5.9)
SODIUM SERPL-SCNC: 135 MMOL/L (ref 133–144)
TSH SERPL DL<=0.005 MIU/L-ACNC: 1.67 MU/L (ref 0.4–4)
WBC # BLD AUTO: 4.8 10E9/L (ref 4–11)

## 2019-01-31 PROCEDURE — 36415 COLL VENOUS BLD VENIPUNCTURE: CPT | Performed by: NURSE PRACTITIONER

## 2019-01-31 PROCEDURE — 84443 ASSAY THYROID STIM HORMONE: CPT | Performed by: NURSE PRACTITIONER

## 2019-01-31 PROCEDURE — 80053 COMPREHEN METABOLIC PANEL: CPT | Performed by: NURSE PRACTITIONER

## 2019-01-31 PROCEDURE — 85027 COMPLETE CBC AUTOMATED: CPT | Performed by: NURSE PRACTITIONER

## 2019-01-31 PROCEDURE — 82306 VITAMIN D 25 HYDROXY: CPT | Performed by: NURSE PRACTITIONER

## 2019-01-31 PROCEDURE — G0463 HOSPITAL OUTPT CLINIC VISIT: HCPCS | Mod: ZF

## 2019-01-31 RX ORDER — METHYLPHENIDATE HYDROCHLORIDE 36 MG/1
36 TABLET ORAL EVERY MORNING
Qty: 30 TABLET | Refills: 0 | Status: SHIPPED | OUTPATIENT
Start: 2019-01-31 | End: 2019-02-26

## 2019-01-31 ASSESSMENT — PAIN SCALES - GENERAL: PAINLEVEL: NO PAIN (0)

## 2019-01-31 NOTE — PATIENT INSTRUCTIONS
Thank you for coming to the PSYCHIATRY CLINIC.    Lab Testing:  If you had lab testing today and your results are reassuring or normal they will be mailed to you or sent through [employer] within 7 days.   If the lab tests need quick action we will call you with the results.  The phone number we will call with results is # [phone] (home) . If this is not the best number please call our clinic and change the number.    Medication Refills:  If you need any refills please call your pharmacy and they will contact us. Our fax number for refills is [phone]. Please allow three business for refill processing.   If you need to  your refill at a new pharmacy, please contact the new pharmacy directly. The new pharmacy will help you get your medications transferred.     Scheduling:  If you have any concerns about today's visit or wish to schedule another appointment please call our office during normal business hours [phone] (8-5:00 M-F)    Contact Us:  Please call [phone] during business hours (8-5:00 M-F).  If after clinic hours, or on the weekend, please call  [phone].    Financial Assistance [phone]  [employer] Billing [phone]  [employer] Billing [phone]  Medical Records [phone]      MENTAL HEALTH CRISIS NUMBERS:  [facility]:   [facility] - [phone]   Crisis Residence [facility] - [phone]   Walk-In Counseling [facility] [phone]   COPE 24/7 [place] Mobile Team for Adults - [[phone]]; Child - [[phone]]     Crisis Connection - [phone]     [facility]:   [facility] - [phone]   Walk-in counseling [facility] - [phone]   Walk-in counseling [facility] - [phone]   Crisis Residence [facility] - [phone]   Urgent Care Adult Mental Health:   --Drop-in, 24/7 crisis line, and [place] Mobile Team [[phone]]    CRISIS TEXT  LINE: Text 741-852 from anywhere, anytime, any crisis 24/7;    OR SEE www.crisistextline.org     Poison Control Center - [phone]    CHILD: [facility] needs assessment team - [phone]     [facility] - [phone]; or [employer] Lif[facility] - 2-519-825-5222    If you have a medical emergency please call 911or go to the nearest ER.                    _____________________________________________    Again thank you for choosing PSYCHIATRY CLINIC and please let us know how we can best partner with you to improve you and your family's health.  You may be receiving a survey in the mail regarding this appointment. We would love to have your feedback, both positive and negative, so please fill out the survey and return it using the provided envelope. The survey is done by an external company, so your answers are anonymous.

## 2019-01-31 NOTE — NURSING NOTE
Chief Complaint   Patient presents with     RECHECK     Attention deficit hyperactivity disorder, inattentive type

## 2019-01-31 NOTE — PROGRESS NOTES
"  Psychiatry Clinic Progress Note                                                                   Shar Atkinson is a 23 year old male who returns to the clinic for follow-up care  Therapist: BRITTANIE Wong  PCP: Behrend, Robert D  Other Providers: None    Pertinent Background:  See previous notes.  Psych critical item history includes suicidal ideation.     Interim History                                                                                                        4, 4     The patient is a good historian, reports good treatment adherence and was last seen 11/8/18.  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  He reports increased mood fluctuations since returning to school and increased impulsive behavior (promiscuity and spending money).   He also reports increased irritability almost daily and worsening of depression symptoms.  Shar also reports typically experiencing worsening of mood when winter comes.  Reports needing to sleep each night but struggles to fall asleep due to racing thoughts.  He also reports increased anxiety especially worrying about classes and studies.  Continues to take Concerta and finds helpful.  Does not attribute worsening anxiety to Concerta.  Shar also reports today that he has had thyroid issues in past.      11/8/18: Shar reports that things are \"amazing...really, really good.\"  Currently in midst of midterms and managing stress effectively.  Depression is well managed by Zoloft.  No concerns with weight loss, increased anxiety, or sleep.  Even though Propranolol helpful, will hold due to history of asthma.  Shar will make appointment and W will attempt to contact PCP    6/5/18: Shar reports he successfully completed his sophomore year.  He plans on staying in [facility] for summer.  Stopped therapy due to thinking he was not staying in [facility] for summer.  He does not plan to continue as he is doing " much better in regards to mental health. He does report that he has been sleeping more since school ended and due to not having a schedule.  Shar reports the Sertraline 100mg daily continues to be helpful in managing mood and anxiety.        Recent Symptoms:   Depression:  depressed mood, anhedonia, low energy, insomnia, appetite changes and poor concentration /memory  Elevated:  increased irritability, racing thoughts, excessive spending, excessive pleasure seeking and excessive risk taking  Psychosis:  none  Anxiety:  nervous/overwhelmed  Panic Attack:  none  Trauma Related:  none     Recent Substance Use:  no changes reported        Social/ Family History                                  [per patient report]                                 1ea,1ea   FINANCIAL SUPPORT- Full-time student at the [employer]       FEELS SAFE AT HOME- Yes    Medical / Surgical History                                                                                                                There is no problem list on file for this patient.      No past surgical history on file.     Medical Review of Systems                                                                                                    2,10   A comprehensive review of systems was performed and is negative other than noted in the HPI.  SVT (hx).  Asthma.  Allergy                                Nuts; Peas; and Seafood  Current Medications                                                                                                       Current Outpatient Medications   Medication Sig Dispense Refill     albuterol (PROAIR HFA/PROVENTIL HFA/VENTOLIN HFA) 108 (90 BASE) MCG/ACT Inhaler   0     methylphenidate ER (CONCERTA) 36 MG CR tablet Take 1 tablet (36 mg) by mouth every morning 30 tablet 0     methylphenidate ER (CONCERTA) 36 MG CR tablet Take 1 tablet (36 mg) by mouth every morning 30 tablet 0     Methylphenidate HCl ER 36 MG 24H tablet Take 1 tablet (36 mg) by  mouth daily 30 tablet 0     sertraline (ZOLOFT) 100 MG tablet Take 1 tablet (100 mg) by mouth daily 30 tablet 2     Vitals                                                                                                                       3, 3   /83   Pulse 85   Wt 73.4 kg (161 lb 12.8 oz)    Mental Status Exam                                                                                    9, 14 cog gs     Alertness: alert  and oriented  Appearance: casually groomed  Behavior/Demeanor: cooperative, pleasant and calm, with good  eye contact   Speech: normal  Language: no obvious problem  Psychomotor: normal or unremarkable  Mood: description consistent with euthymia  Affect: full range; was congruent to mood; was congruent to content  Thought Process/Associations: unremarkable  Thought Content:  Reports none;  Denies suicidal ideation, violent ideation, delusions and paranoid ideation  Perception:  Reports none;  Denies auditory hallucinations and visual hallucinations  Insight: good  Judgment: good  Cognition: (6) does  appear grossly intact; formal cognitive testing was not done  Gait/Station and/or Muscle Strength/Tone: unremarkable    Labs and Data                                                                                                                 Rating Scales:    PHQ9    PHQ9 Today:  3  PHQ-9 SCORE 4/24/2018 6/5/2018 11/8/2018   PHQ-9 Total Score 14 6 3         Diagnosis and Assessment                                                                             m2, h3     Today the following issues were addressed:    1) Major Depressive Disorder, recurrent, moderate  2) Generalized Anxiety Disorder  3) ADHD (Hx)     [place] Prescription Monitoring Program [] was checked today:  indicates methylphenidate 30mg filled regularly .     PSYCHOTROPIC DRUG INTERACTIONS: Methylphenidate-Sertraline: Concurrent use of METHYLPHENIDATE and SELECTIVE SEROTONIN REUPTAKE INHIBITORS may result in increased  selective serotonin reuptake inhibitor plasma concentrations.  Propranolol-Sertraline: Concurrent use of PROPRANOLOL and SERTRALINE may result in an increased risk of chest pain. .    Plan                                                                                                                    m2, h3      1) Management of mood and anxiety  Therapy- Continue  Medications-   Continue Sertraline 100mg daily  Start Lamictal 25mg for two weeks and increase to 50mg for weeks 3 and 4.    Continue/restart Propranolol 20mg TID PRN for performance anxiety and periodic anxiety due to asthma diagnosis.  Shar will schedule appointment with PCP when home for holiday break.  If PCP is agreement, Propranolol may be restarted as it was helpful.     2) Attention management  Medications-   Continue Methylphenidate ER 36mg      RTC: 1 month  CRISIS NUMBERS:   Provided routinely in AVS.    Treatment Risk Statement:  The patient understands the risks, benefits, adverse effects and alternatives. Agrees to treatment with the capacity to do so. No medical contraindications to treatment. Agrees to call clinic for any problems. The patient understands to call 911 or go to the nearest ED if life threatening or urgent symptoms occur.     PROVIDER:  JOMAR Palm CNP

## 2019-02-01 ENCOUNTER — OFFICE VISIT (OUTPATIENT)
Dept: PSYCHIATRY | Facility: CLINIC | Age: 24
End: 2019-02-01
Attending: PSYCHOLOGIST
Payer: COMMERCIAL

## 2019-02-01 DIAGNOSIS — F33.1 MAJOR DEPRESSIVE DISORDER, RECURRENT EPISODE, MODERATE (H): Primary | ICD-10-CM

## 2019-02-01 LAB — DEPRECATED CALCIDIOL+CALCIFEROL SERPL-MC: 14 UG/L (ref 20–75)

## 2019-02-01 RX ORDER — LAMOTRIGINE 25 MG/1
TABLET ORAL
Qty: 50 TABLET | Refills: 0 | Status: SHIPPED | OUTPATIENT
Start: 2019-02-01 | End: 2019-02-26

## 2019-02-01 RX ORDER — PROPRANOLOL HYDROCHLORIDE 10 MG/1
10 TABLET ORAL 2 TIMES DAILY
Qty: 90 TABLET | Refills: 0 | Status: SHIPPED | OUTPATIENT
Start: 2019-02-01 | End: 2019-03-17

## 2019-02-01 RX ORDER — SERTRALINE HYDROCHLORIDE 100 MG/1
100 TABLET, FILM COATED ORAL DAILY
Qty: 30 TABLET | Refills: 2 | Status: SHIPPED | OUTPATIENT
Start: 2019-02-01 | End: 2019-05-08

## 2019-02-01 NOTE — PROGRESS NOTES
Therapy Notes  Provider: Sara Byers, PhD, LP  Starts: 11:10 am- 12:09 pm       Objective: STACY arrived on time. He will call for a future appointment.      Subjective: STACY stated that he has trouble falling asleep but not long periods of at least 3 days without sleep. He described spurts of energy but they do not last for 3 days just a few hours or about 1 day at most but not more than that. During those periods he is doing mostly homework, housework, going to workout, or grocery shopping. During those periods he is more social but not overly intense nor experiencing preassure to speak or flight of ideas. He has been mildly impulsive during those periods such as been generous with [employer], or drinking a bit more. Two months ago he had unprotected sex with a partner at a partner while he was steady with another person.This however was experienced as unrelated to one of the spurts of high energy and more like a spontaneous thing that happened.  He described that after those periods he crashes he feels emotionally drained, and he feels sad super quick and starts analyzing past things that happened, the future or/and stuff that he should do. He feels like he does not want to be at school. He starts overanalyzing and ruminating and it lasts for more time for about 1 day or more than that it can go more than that. He denied suicide ideation.  Shar stated that this started in about November 2018 and he did not come to see me because he was to busy and able to slug it through until he talked to his mother and she advised him to come to see me.   He will continue taking Soloft (100 mg).  We examined the pros and cons of a behavioral activation plan that included exercising in his building which has an easily accessible gym. We then agreed on two days Sunday at noon and Tuesday at noon. We examined the obstacles and the rewards (katlyn) after exercising.      These are A past sources of anxiety  1. Pre exam anxiety and right  before the exam: 10  2. Arguments with friends: 7  3. Arguments with boyfriends: 8  4. 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  - TA's: 5  -Professors: 6-7.      Assessment: A is experiencing emotional liability that does not meet diagnostic criteria for a bipolar disorder.      Plan: Check on homework re behavioral activation and katlyn as a reward after.      Diagnosis  Major depressive disorder, recurrent episode,moderate (H)

## 2019-02-19 ASSESSMENT — PATIENT HEALTH QUESTIONNAIRE - PHQ9: SUM OF ALL RESPONSES TO PHQ QUESTIONS 1-9: 16

## 2019-02-26 ENCOUNTER — OFFICE VISIT (OUTPATIENT)
Dept: PSYCHIATRY | Facility: CLINIC | Age: 24
End: 2019-02-26
Attending: NURSE PRACTITIONER
Payer: COMMERCIAL

## 2019-02-26 VITALS — HEART RATE: 101 BPM | WEIGHT: 159 LBS | SYSTOLIC BLOOD PRESSURE: 136 MMHG | DIASTOLIC BLOOD PRESSURE: 88 MMHG

## 2019-02-26 DIAGNOSIS — F39 MOOD DISORDER (H): ICD-10-CM

## 2019-02-26 DIAGNOSIS — F90.0 ATTENTION DEFICIT HYPERACTIVITY DISORDER, INATTENTIVE TYPE: ICD-10-CM

## 2019-02-26 PROCEDURE — G0463 HOSPITAL OUTPT CLINIC VISIT: HCPCS | Mod: ZF

## 2019-02-26 RX ORDER — LAMOTRIGINE 100 MG/1
100 TABLET ORAL DAILY
Qty: 30 TABLET | Refills: 1 | Status: SHIPPED | OUTPATIENT
Start: 2019-02-26 | End: 2019-04-28

## 2019-02-26 RX ORDER — METHYLPHENIDATE HYDROCHLORIDE 36 MG/1
36 TABLET ORAL EVERY MORNING
Qty: 30 TABLET | Refills: 0 | Status: SHIPPED | OUTPATIENT
Start: 2019-02-26 | End: 2019-05-14

## 2019-02-26 ASSESSMENT — PAIN SCALES - GENERAL: PAINLEVEL: SEVERE PAIN (6)

## 2019-02-26 NOTE — PROGRESS NOTES
"  Psychiatry Clinic Progress Note                                                                   Shar Atkinson is a 23 year old male who returns to the clinic for follow-up care  Therapist: BRITTANIE Wong  PCP: Behrend, Robert D  Other Providers: None    Pertinent Background:  See previous notes.  Psych critical item history includes suicidal ideation.     Interim History                                                                                                        4, 4     The patient is a good historian, reports good treatment adherence and was last seen 1/31/19.  Since the last visit, Shar reports since starting Lamictal that he feels that mood has leveled out.  He reports irritability and mood elevation has decreased.    He is feeling less of the \"highs.\"  States impulsive spending has ended but does continue to experience some promiscuity.  Overall, he believes that the medication has been helpful in managing mood.  Shar has experienced significant stressor including losing friend, discovering partner was cheating, familial stress, and breaking hand.  Also reports experiencing low mood several days since last appointment. 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  He reports increased mood fluctuations since returning to school and increased impulsive behavior (promiscuity and spending money).   He also reports increased irritability almost daily and worsening of depression symptoms.  Shar also reports typically experiencing worsening of mood when winter comes.  Reports needing to sleep each night but struggles to fall asleep due to racing thoughts.  He also reports increased anxiety especially worrying about classes and studies.  Continues to take Concerta and finds helpful.  Does not attribute worsening anxiety to Concerta.  Shar also reports today that he has had thyroid issues in past.      11/8/18: " "Shar reports that things are \"amazing...really, really good.\"  Currently in midst of midterms and managing stress effectively.  Depression is well managed by Zoloft.  No concerns with weight loss, increased anxiety, or sleep.  Even though Propranolol helpful, will hold due to history of asthma.  Shar will make appointment and W will attempt to contact PCP    6/5/18: Shar reports he successfully completed his sophomore year.  He plans on staying in [facility] for summer.  Stopped therapy due to thinking he was not staying in [facility] for summer.  He does not plan to continue as he is doing much better in regards to mental health. He does report that he has been sleeping more since school ended and due to not having a schedule.  Shar reports the Sertraline 100mg daily continues to be helpful in managing mood and anxiety.        Recent Symptoms:   Depression:  depressed mood, anhedonia, low energy, insomnia, appetite changes and poor concentration /memory  Elevated:  increased irritability and excessive pleasure seeking  Psychosis:  none  Anxiety:  nervous/overwhelmed  Panic Attack:  none  Trauma Related:  none     Recent Substance Use:  no changes reported        Social/ Family History                                  [per patient report]                                 1ea,1ea   FINANCIAL SUPPORT- Full-time student at the [employer]       FEELS SAFE AT HOME- Yes    Medical / Surgical History                                                                                                                There is no problem list on file for this patient.      No past surgical history on file.     Medical Review of Systems                                                                                                    2,10   A comprehensive review of systems was performed and is negative other than noted in the HPI.  SVT (hx).  Asthma.  Allergy                                Nuts; Peas; and Seafood  Current Medications           " "                                                                                            Current Outpatient Medications   Medication Sig Dispense Refill     albuterol (PROAIR HFA/PROVENTIL HFA/VENTOLIN HFA) 108 (90 BASE) MCG/ACT Inhaler   0     lamoTRIgine (LAMICTAL) 25 MG tablet Take 1 tablet (25mg) daily for 2 weeks then increase to 2 tablets (50mg) daily at the beginning of week 3. 50 tablet 0     methylphenidate (CONCERTA) 36 MG CR tablet Take 1 tablet (36 mg) by mouth every morning 30 tablet 0     methylphenidate ER (CONCERTA) 36 MG CR tablet Take 1 tablet (36 mg) by mouth every morning 30 tablet 0     Methylphenidate HCl ER 36 MG 24H tablet Take 1 tablet (36 mg) by mouth daily 30 tablet 0     propranolol (INDERAL) 10 MG tablet Take 1 tablet (10 mg) by mouth 2 times daily As needed for anxiety 90 tablet 0     sertraline (ZOLOFT) 100 MG tablet Take 1 tablet (100 mg) by mouth daily 30 tablet 2     Vitals                                                                                                                       3, 3   /88   Pulse 101   Wt 72.1 kg (159 lb)    Mental Status Exam                                                                                    9, 14 cog gs     Alertness: alert  and oriented  Appearance: casually groomed  Behavior/Demeanor: cooperative, pleasant and calm, with good  eye contact   Speech: normal  Language: no obvious problem  Psychomotor: normal or unremarkable  Mood: \"ok, better.\"  Affect: full range; was congruent to mood; was congruent to content  Thought Process/Associations: unremarkable  Thought Content:  Reports none;  Denies suicidal ideation, violent ideation, delusions and paranoid ideation  Perception:  Reports none;  Denies auditory hallucinations and visual hallucinations  Insight: good  Judgment: good  Cognition: (6) does  appear grossly intact; formal cognitive testing was not done  Gait/Station and/or Muscle Strength/Tone: unremarkable    Labs and " Data                                                                                                                 Rating Scales:    PHQ9    PHQ9 Today:  10  PHQ-9 SCORE 6/5/2018 11/8/2018 1/31/2019   PHQ-9 Total Score 6 3 16         Diagnosis and Assessment                                                                             m2, h3     Today the following issues were addressed:    1) Major Depressive Disorder, recurrent, moderate  2) Generalized Anxiety Disorder  3) ADHD (Hx)     [place] Prescription Monitoring [place] [] was checked today:  indicates methylphenidate 30mg filled regularly .     PSYCHOTROPIC DRUG INTERACTIONS: Methylphenidate-Sertraline: Concurrent use of METHYLPHENIDATE and SELECTIVE SEROTONIN REUPTAKE INHIBITORS may result in increased selective serotonin reuptake inhibitor plasma concentrations.  Propranolol-Sertraline: Concurrent use of PROPRANOLOL and SERTRALINE may result in an increased risk of chest pain. .    Plan                                                                                                                    m2, h3      1) Management of mood and anxiety  Therapy- Continue  Medications-   Continue Sertraline 100mg daily  Increase Lamictal to 100mg daily  Continue/restart Propranolol 20mg TID PRN for performance anxiety and periodic anxiety due to asthma diagnosis.  Asthma well controlled.     2) Attention management  Medications-   Continue Methylphenidate ER 36mg      RTC: 1 month  CRISIS NUMBERS:   Provided routinely in AVS.    Treatment Risk Statement:  The patient understands the risks, benefits, adverse effects and alternatives. Agrees to treatment with the capacity to do so. No medical contraindications to treatment. Agrees to call clinic for any problems. The patient understands to call 911 or go to the nearest ED if life threatening or urgent symptoms occur.     PROVIDER:  JOMAR Palm CNP

## 2019-02-26 NOTE — LETTER
Patient:  Shar Atkinson  :   1995  MRN:     [id number]      2019    Patient Name:  Shar Atkinson    Physician: JOMAR Palm CNP    To Whom It May Concern,    Shar is a patient of mine and is currently experiencing significant psychosocial stressors and is in process of a medication change.  He also recently broke his hand.  An extension through Monday (3/4/19) for his upcoming exam would be help very helpful to allow him to effectively manage current stressors.      Thank you for your consideration,          Diego Parikh DNP, APRN          [phone]  1995

## 2019-03-17 DIAGNOSIS — F41.1 GAD (GENERALIZED ANXIETY DISORDER): ICD-10-CM

## 2019-03-19 RX ORDER — PROPRANOLOL HYDROCHLORIDE 10 MG/1
10 TABLET ORAL 2 TIMES DAILY PRN
Qty: 90 TABLET | Refills: 0 | Status: SHIPPED | OUTPATIENT
Start: 2019-03-19 | End: 2019-06-07

## 2019-03-19 NOTE — TELEPHONE ENCOUNTER
Medication requested: INDERAL 10 MG TAB  Last refilled: 2/1/19  Qty: 90      Last seen: 2/26/19  RTC: 1 MONTH  Cancel: 0  No-show: 0  Next appt: 3/26/19    Refill decision:   Refill pended and routed to the provider for review/determination due to   NOTE FROM 2/26/19 UNSIGNED  Note differs from medication order  Note reads--Continue/restart Propranolol 20mg TID PRN for performance anxiety and periodic anxiety due to asthma diagnosis  Ordered-inderal 10 mg-Take 1 tablet (10 mg) by mouth 2 times daily As needed for anxiety

## 2019-03-21 ENCOUNTER — TELEPHONE (OUTPATIENT)
Dept: PSYCHIATRY | Facility: CLINIC | Age: 24
End: 2019-03-21

## 2019-03-21 NOTE — TELEPHONE ENCOUNTER
Pt sent the following message with attached form to be completed. Writer routed from to provider.     This message is directed towards Yossi York,    I talked to you during our last meeting about dropping classes that I took last semester and failed as a result of my disabilities. This is affecting my abilities to apply and get internships as my GPA was hit very hard from these grades. If you would be able to fill out this form for me I may be able to get these two classes moved from my transcript which would highly boost my confidence as well as my GPA. I plan on submitting this ASAP.     Shar John  [facility]  Biochemistry  [employer]  [email or web address]  [phone]

## 2019-03-22 ASSESSMENT — PATIENT HEALTH QUESTIONNAIRE - PHQ9: SUM OF ALL RESPONSES TO PHQ QUESTIONS 1-9: 10

## 2019-03-25 NOTE — TELEPHONE ENCOUNTER
Writer received completed form from provider. Writer called pt, no answer, writer left message informing that Medical Supplement form was completed. Writer emailed form to pt's requested email, [email or web address] and directed pt to call the clinic with any other additional needs.

## 2019-04-28 DIAGNOSIS — F39 MOOD DISORDER (H): ICD-10-CM

## 2019-04-30 NOTE — TELEPHONE ENCOUNTER
Steve Parikh APRN CNP Pratt, Laura, RN   Caller: Unspecified (2 days ago,  8:48 AM)             Santos Michaud,     Could you please call Shar and see if has been taking Lamictal regularly and if has any concerns?     Thanks     steve          Writer called pt per provider's request. No answer. LVM requesting a c/b.

## 2019-04-30 NOTE — TELEPHONE ENCOUNTER
Medication requested: lamoTRIgine (LAMICTAL) 100 MG tablet  Last refilled: 4/2/19  Qty: 30      Last seen: 2/26/19  RTC: 1 MONTH  Cancel: 0  No-show: 1  Next appt: NOT SCHEDULED     Refill decision:   Refill pended and routed to the provider for review/determination due to   NO SHOW X 1  Pt outside of RTC timeframe.  Scheduling has been notified to contact the pt for appointment.

## 2019-05-02 NOTE — TELEPHONE ENCOUNTER
Writer tried calling pt again. No answer. Left another VM requesting a c/b. Will notify provider.

## 2019-05-03 RX ORDER — LAMOTRIGINE 100 MG/1
100 TABLET ORAL DAILY
Qty: 15 TABLET | Refills: 0 | Status: SHIPPED | OUTPATIENT
Start: 2019-05-03 | End: 2019-05-14

## 2019-05-03 NOTE — TELEPHONE ENCOUNTER
Writer received incoming call from patient [phone]. Patient reports taking Lamotrigine as scheduled and denies missing any dose. Patient will be out of the medication tomorrow. Denies side effects with the meds. Writer agreed to refill meds through 5/14/19.     Meds refilled through the appt on 5/14/19  Patient notified of the refill     No further action needed by this writer

## 2019-05-08 DIAGNOSIS — F33.1 MAJOR DEPRESSIVE DISORDER, RECURRENT EPISODE, MODERATE (H): ICD-10-CM

## 2019-05-09 NOTE — TELEPHONE ENCOUNTER
Medication requested:ZOLOFT 100MG TAB  Last refilled: 4/10/19  Qty: 30       Last seen: 2/26/19  RTC: 1 MONTH  Cancel: 1  No-show: 1  Next appt: 5/14/19     Refill decision:   Refill pended and routed to the provider for review/determination due to   NO SHOW X 1    CANCEL X1  Pt outside of RTC timeframe.  .            April 28, 2019

## 2019-05-10 RX ORDER — SERTRALINE HYDROCHLORIDE 100 MG/1
100 TABLET, FILM COATED ORAL DAILY
Qty: 30 TABLET | Refills: 0 | Status: SHIPPED | OUTPATIENT
Start: 2019-05-10 | End: 2019-05-14

## 2019-05-14 ENCOUNTER — OFFICE VISIT (OUTPATIENT)
Dept: PSYCHIATRY | Facility: CLINIC | Age: 24
End: 2019-05-14
Attending: NURSE PRACTITIONER
Payer: COMMERCIAL

## 2019-05-14 VITALS — DIASTOLIC BLOOD PRESSURE: 77 MMHG | SYSTOLIC BLOOD PRESSURE: 126 MMHG | HEART RATE: 94 BPM | WEIGHT: 160.2 LBS

## 2019-05-14 DIAGNOSIS — F39 MOOD DISORDER (H): ICD-10-CM

## 2019-05-14 DIAGNOSIS — F33.1 MAJOR DEPRESSIVE DISORDER, RECURRENT EPISODE, MODERATE (H): ICD-10-CM

## 2019-05-14 DIAGNOSIS — F90.0 ATTENTION DEFICIT HYPERACTIVITY DISORDER, INATTENTIVE TYPE: ICD-10-CM

## 2019-05-14 DIAGNOSIS — F41.1 GAD (GENERALIZED ANXIETY DISORDER): ICD-10-CM

## 2019-05-14 PROCEDURE — G0463 HOSPITAL OUTPT CLINIC VISIT: HCPCS | Mod: ZF

## 2019-05-14 RX ORDER — METHYLPHENIDATE HYDROCHLORIDE 36 MG/1
36 TABLET ORAL EVERY MORNING
Qty: 30 TABLET | Refills: 0 | Status: SHIPPED | OUTPATIENT
Start: 2019-07-14 | End: 2019-08-13

## 2019-05-14 RX ORDER — METHYLPHENIDATE HYDROCHLORIDE 36 MG/1
36 TABLET ORAL EVERY MORNING
Qty: 30 TABLET | Refills: 0 | Status: SHIPPED | OUTPATIENT
Start: 2019-06-14 | End: 2019-08-13

## 2019-05-14 RX ORDER — SERTRALINE HYDROCHLORIDE 100 MG/1
100 TABLET, FILM COATED ORAL DAILY
Qty: 30 TABLET | Refills: 0 | Status: SHIPPED | OUTPATIENT
Start: 2019-05-14 | End: 2019-05-14

## 2019-05-14 RX ORDER — METHYLPHENIDATE HYDROCHLORIDE 36 MG/1
36 TABLET ORAL EVERY MORNING
Qty: 30 TABLET | Refills: 0 | Status: SHIPPED | OUTPATIENT
Start: 2019-05-14 | End: 2019-08-13

## 2019-05-14 RX ORDER — SERTRALINE HYDROCHLORIDE 100 MG/1
100 TABLET, FILM COATED ORAL DAILY
Qty: 30 TABLET | Refills: 2 | Status: SHIPPED | OUTPATIENT
Start: 2019-05-14 | End: 2019-07-11

## 2019-05-14 RX ORDER — LAMOTRIGINE 100 MG/1
100 TABLET ORAL DAILY
Qty: 30 TABLET | Refills: 2 | Status: SHIPPED | OUTPATIENT
Start: 2019-05-14 | End: 2019-08-13

## 2019-05-14 ASSESSMENT — PAIN SCALES - GENERAL: PAINLEVEL: NO PAIN (0)

## 2019-05-14 NOTE — PATIENT INSTRUCTIONS
Thank you for coming to the PSYCHIATRY CLINIC.    Lab Testing:  If you had lab testing today and your results are reassuring or normal they will be mailed to you or sent through [employer] within 7 days.   If the lab tests need quick action we will call you with the results.  The phone number we will call with results is # [phone] (home) . If this is not the best number please call our clinic and change the number.    Medication Refills:  If you need any refills please call your pharmacy and they will contact us. Our fax number for refills is [phone]. Please allow three business for refill processing.   If you need to  your refill at a new pharmacy, please contact the new pharmacy directly. The new pharmacy will help you get your medications transferred.     Scheduling:  If you have any concerns about today's visit or wish to schedule another appointment please call our office during normal business hours [phone] (8-5:00 M-F)    Contact Us:  Please call [phone] during business hours (8-5:00 M-F).  If after clinic hours, or on the weekend, please call  [phone].    Financial Assistance [phone]  [employer] Billing [phone]  [facility] Billing [phone]  Medical Records [phone]      MENTAL HEALTH CRISIS NUMBERS:  [facility]:   [facility] - [phone]   Crisis Residence [facility] - [phone]   Walk-In Counseling [facility] [phone]   COPE 24/7 [place] Mobile Team for Adults - [[phone]]; Child - [[phone]]        [facility]:   [facility] - [phone]   Walk-in counseling [facility] - [phone]   Walk-in counseling [facility] - [phone]   Crisis Residence [facility] - [phone]   Urgent Care Adult Mental Health:   --Drop-in, 24/7 crisis line, and [place] Mobile Team [[phone]]    CRISIS TEXT LINE: Text [phone] from anywhere,  anytime, any crisis 24/7;    OR SEE [email or web address].org     Poison Control Center - [phone]    CHILD: [facility] needs assessment team - [phone]     [facility] - [phone]; or [employer] [facility] - [phone]    If you have a medical emergency please call 911or go to the nearest ER.                    _____________________________________________    Again thank you for choosing PSYCHIATRY CLINIC and please let us know how we can best partner with you to improve you and your family's health.  You may be receiving a survey in the mail regarding this appointment. We would love to have your feedback, both positive and negative, so please fill out the survey and return it using the provided envelope. The survey is done by an external company, so your answers are anonymous.

## 2019-05-14 NOTE — PROGRESS NOTES
"  Psychiatry Clinic Progress Note                                                                   Shar Atkinson is a 23 year old male who returns to the clinic for follow-up care  Therapist: BRITTANIE Wong  PCP: Behrend, Robert D  Other Providers: None    Pertinent Background:  See previous notes.  Psych critical item history includes suicidal ideation.     Interim History                                                                                                        4, 4     The patient is a good historian, reports good treatment adherence and was last seen 2/26/19.  Since the last visit, Shar reports that he is \"doing good overall.\"  Stressed due to being in midst of finals week which he attributes to worsening mood.  No concerns with academics. 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 He believes the Lamictal has been \"evening out his mood.\"  No concerns with impulsivity including sexual promiscuity and excessive spending.  No concerns with sleep.  He is interviewing for summer internship currently.  Continues to take Concerta regularly and finds helpful with management of attention deficits.      2/26/19: Shar reports since starting Lamictal that he feels that mood has leveled out.  He reports irritability and mood elevation has decreased.    He is feeling less of the \"highs.\"  States impulsive spending has ended but does continue to experience some promiscuity.  Overall, he believes that the medication has been helpful in managing mood.  Shar has experienced significant stressor including losing friend, discovering partner was cheating, familial stress, and breaking hand.  Also reports experiencing low mood several days since last appointment. 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  He reports increased mood fluctuations since returning to school and increased " "impulsive behavior (promiscuity and spending money).   He also reports increased irritability almost daily and worsening of depression symptoms.  Shar also reports typically experiencing worsening of mood when winter comes.  Reports needing to sleep each night but struggles to fall asleep due to racing thoughts.  He also reports increased anxiety especially worrying about classes and studies.  Continues to take Concerta and finds helpful.  Does not attribute worsening anxiety to Concerta.  Shar also reports today that he has had thyroid issues in past.      11/8/18: Shar reports that things are \"amazing...really, really good.\"  Currently in midst of midterms and managing stress effectively.  Depression is well managed by Zoloft.  No concerns with weight loss, increased anxiety, or sleep.  Even though Propranolol helpful, will hold due to history of asthma.  Shar will make appointment and W will attempt to contact PCP    6/5/18: Shar reports he successfully completed his sophomore year.  He plans on staying in [place] for summer.  Stopped therapy due to thinking he was not staying in [place] for summer.  He does not plan to continue as he is doing much better in regards to mental health. He does report that he has been sleeping more since school ended and due to not having a schedule.  Shar reports the Sertraline 100mg daily continues to be helpful in managing mood and anxiety.        Recent Symptoms:   Depression:  depressed mood, anhedonia, low energy, insomnia, appetite changes and poor concentration /memory  Elevated:  none  Psychosis:  none  Anxiety:  nervous/overwhelmed  Panic Attack:  none  Trauma Related:  none     Recent Substance Use:  no changes reported        Social/ Family History                                  [per patient report]                                 1ea,1ea   FINANCIAL SUPPORT- Full-time student at the [employer]       FEELS SAFE AT HOME- Yes    Medical / Surgical History                  "                                                                                               There is no problem list on file for this patient.      No past surgical history on file.     Medical Review of Systems                                                                                                    2,10   A comprehensive review of systems was performed and is negative other than noted in the HPI.  SVT (hx).  Asthma.  Allergy                                Nuts; Peas; and Seafood  Current Medications                                                                                                       Current Outpatient Medications   Medication Sig Dispense Refill     albuterol (PROAIR HFA/PROVENTIL HFA/VENTOLIN HFA) 108 (90 BASE) MCG/ACT Inhaler   0     lamoTRIgine (LAMICTAL) 100 MG tablet Take 1 tablet (100 mg) by mouth daily Additional refills will be completed at the appt. 15 tablet 0     methylphenidate (CONCERTA) 36 MG CR tablet Take 1 tablet (36 mg) by mouth every morning 30 tablet 0     methylphenidate ER (CONCERTA) 36 MG CR tablet Take 1 tablet (36 mg) by mouth every morning 30 tablet 0     Methylphenidate HCl ER 36 MG 24H tablet Take 1 tablet (36 mg) by mouth daily 30 tablet 0     propranolol (INDERAL) 10 MG tablet Take 1 tablet (10 mg) by mouth 2 times daily as needed (anxiety) 90 tablet 0     sertraline (ZOLOFT) 100 MG tablet Take 1 tablet (100 mg) by mouth daily 30 tablet 0     Vitals                                                                                                                       3, 3   /77   Pulse 94   Wt 72.7 kg (160 lb 3.2 oz)    Mental Status Exam                                                                                    9, 14 cog gs     Alertness: alert  and oriented  Appearance: casually groomed  Behavior/Demeanor: cooperative, pleasant and calm, with good  eye contact   Speech: regular rate and rhythm  Language: good  Psychomotor: normal or  "unremarkable  Mood: \"good\"  Affect: full range; was congruent to mood; was congruent to content  Thought Process/Associations: unremarkable  Thought Content:  Reports none;  Denies suicidal ideation, violent ideation, delusions and paranoid ideation  Perception:  Reports none;  Denies auditory hallucinations and visual hallucinations  Insight: good  Judgment: good  Cognition: (6) does  appear grossly intact; formal cognitive testing was not done  Gait/Station and/or Muscle Strength/Tone: unremarkable    Labs and Data                                                                                                                 Rating Scales:    PHQ9    PHQ9 Today:  8  PHQ-9 SCORE 11/8/2018 1/31/2019 2/26/2019   PHQ-9 Total Score 3 16 10         Diagnosis and Assessment                                                                             m2, h3     Today the following issues were addressed:    1) Major Depressive Disorder, recurrent, moderate  2) Generalized Anxiety Disorder  3) ADHD (Hx)     [place] Prescription Monitoring Program [] was checked today:  indicates methylphenidate 30mg filled regularly .     PSYCHOTROPIC DRUG INTERACTIONS: Methylphenidate-Sertraline: Concurrent use of METHYLPHENIDATE and SELECTIVE SEROTONIN REUPTAKE INHIBITORS may result in increased selective serotonin reuptake inhibitor plasma concentrations.  Propranolol-Sertraline: Concurrent use of PROPRANOLOL and SERTRALINE may result in an increased risk of chest pain. .    Plan                                                                                                                    m2, h3      1) Management of mood and anxiety  Therapy- Continue  Medications-   Continue Sertraline 100mg daily  Continue Lamictal to 100mg daily  Continue Propranolol 20mg TID PRN for performance anxiety and periodic anxiety due to asthma diagnosis.  Asthma well controlled.     2) Attention management  Medications-   Continue Methylphenidate ER 36mg "      RTC: 3 months  CRISIS NUMBERS:   Provided routinely in AVS.    Treatment Risk Statement:  The patient understands the risks, benefits, adverse effects and alternatives. Agrees to treatment with the capacity to do so. No medical contraindications to treatment. Agrees to call clinic for any problems. The patient understands to call 911 or go to the nearest ED if life threatening or urgent symptoms occur.     PROVIDER:  JOMAR Palm CNP

## 2019-05-26 DIAGNOSIS — F39 MOOD DISORDER (H): ICD-10-CM

## 2019-05-27 RX ORDER — LAMOTRIGINE 100 MG/1
TABLET ORAL
Qty: 30 TABLET | Refills: 2 | OUTPATIENT
Start: 2019-05-27

## 2019-06-04 DIAGNOSIS — F33.1 MAJOR DEPRESSIVE DISORDER, RECURRENT EPISODE, MODERATE (H): ICD-10-CM

## 2019-06-06 RX ORDER — SERTRALINE HYDROCHLORIDE 100 MG/1
TABLET, FILM COATED ORAL
Qty: 30 TABLET | Refills: 0 | OUTPATIENT
Start: 2019-06-06

## 2019-06-07 RX ORDER — PROPRANOLOL HYDROCHLORIDE 20 MG/1
20 TABLET ORAL 2 TIMES DAILY
COMMUNITY
Start: 2019-06-07 | End: 2019-08-13

## 2019-06-07 ASSESSMENT — PATIENT HEALTH QUESTIONNAIRE - PHQ9: SUM OF ALL RESPONSES TO PHQ QUESTIONS 1-9: 8

## 2019-07-11 DIAGNOSIS — F33.1 MAJOR DEPRESSIVE DISORDER, RECURRENT EPISODE, MODERATE (H): ICD-10-CM

## 2019-07-15 RX ORDER — SERTRALINE HYDROCHLORIDE 100 MG/1
100 TABLET, FILM COATED ORAL DAILY
Qty: 30 TABLET | Refills: 0 | Status: SHIPPED | OUTPATIENT
Start: 2019-07-15 | End: 2019-08-13

## 2019-07-15 NOTE — TELEPHONE ENCOUNTER
Medication requested: sertraline (ZOLOFT) 100 MG tablet  Last refilled: 6-19-19  Qty: 30      Last seen: 5-14-19  RTC: 3 mo  Cancel: 0  No-show: 0  Next appt: 8-13-19    Refill decision:   Refilled for 30 days per protocol.      Kathleen M Doege RN

## 2019-08-13 ENCOUNTER — OFFICE VISIT (OUTPATIENT)
Dept: PSYCHIATRY | Facility: CLINIC | Age: 24
End: 2019-08-13
Attending: NURSE PRACTITIONER
Payer: COMMERCIAL

## 2019-08-13 VITALS — WEIGHT: 154.6 LBS | DIASTOLIC BLOOD PRESSURE: 80 MMHG | SYSTOLIC BLOOD PRESSURE: 122 MMHG | HEART RATE: 67 BPM

## 2019-08-13 DIAGNOSIS — F39 MOOD DISORDER (H): ICD-10-CM

## 2019-08-13 DIAGNOSIS — F90.0 ATTENTION DEFICIT HYPERACTIVITY DISORDER, INATTENTIVE TYPE: ICD-10-CM

## 2019-08-13 DIAGNOSIS — F33.1 MAJOR DEPRESSIVE DISORDER, RECURRENT EPISODE, MODERATE (H): ICD-10-CM

## 2019-08-13 DIAGNOSIS — F41.1 GAD (GENERALIZED ANXIETY DISORDER): ICD-10-CM

## 2019-08-13 PROCEDURE — G0463 HOSPITAL OUTPT CLINIC VISIT: HCPCS | Mod: ZF

## 2019-08-13 RX ORDER — METHYLPHENIDATE HYDROCHLORIDE 36 MG/1
36 TABLET ORAL EVERY MORNING
Qty: 30 TABLET | Refills: 0 | Status: SHIPPED | OUTPATIENT
Start: 2019-09-13 | End: 2019-11-12

## 2019-08-13 RX ORDER — SERTRALINE HYDROCHLORIDE 100 MG/1
100 TABLET, FILM COATED ORAL DAILY
Qty: 30 TABLET | Refills: 2 | Status: SHIPPED | OUTPATIENT
Start: 2019-08-13 | End: 2019-11-12

## 2019-08-13 RX ORDER — METHYLPHENIDATE HYDROCHLORIDE 36 MG/1
36 TABLET ORAL EVERY MORNING
Qty: 30 TABLET | Refills: 0 | Status: SHIPPED | OUTPATIENT
Start: 2019-10-13 | End: 2019-11-12

## 2019-08-13 RX ORDER — PROPRANOLOL HYDROCHLORIDE 20 MG/1
20 TABLET ORAL 2 TIMES DAILY
Qty: 60 TABLET | Refills: 1 | Status: SHIPPED | OUTPATIENT
Start: 2019-08-13

## 2019-08-13 RX ORDER — LAMOTRIGINE 100 MG/1
100 TABLET ORAL DAILY
Qty: 30 TABLET | Refills: 2 | Status: SHIPPED | OUTPATIENT
Start: 2019-08-13 | End: 2019-11-12

## 2019-08-13 RX ORDER — ALBUTEROL SULFATE 90 UG/1
AEROSOL, METERED RESPIRATORY (INHALATION)
Refills: 0 | Status: CANCELLED | OUTPATIENT
Start: 2019-08-13

## 2019-08-13 RX ORDER — METHYLPHENIDATE HYDROCHLORIDE 36 MG/1
36 TABLET ORAL EVERY MORNING
Qty: 30 TABLET | Refills: 0 | Status: SHIPPED | OUTPATIENT
Start: 2019-08-13 | End: 2019-11-12

## 2019-08-13 ASSESSMENT — PATIENT HEALTH QUESTIONNAIRE - PHQ9: SUM OF ALL RESPONSES TO PHQ QUESTIONS 1-9: 9

## 2019-08-13 ASSESSMENT — PAIN SCALES - GENERAL: PAINLEVEL: NO PAIN (0)

## 2019-08-13 NOTE — PROGRESS NOTES
"  Psychiatry Clinic Progress Note                                                                   Shar Atkinson is a 24 year old male who returns to the clinic for follow-up care  Therapist:  None  PCP: Behrend, Robert D  Other Providers: None    Pertinent Background:  See previous notes.  Psych critical item history includes suicidal ideation.     Interim History                                                                                                        4, 4     The patient is a good historian, reports good treatment adherence and was last seen 5/14/19.  Since the last visit, Shar reports his summer has been \"busy.\"  States he is \"pretty good overall\" and is reacting to events appropriately.  He is facing increased stress including family stress, having to move, and preparing next school year.  Sleeps through the night and no issue falling asleep.  Continues to endorse that Lamictal is managing mood fluctuations and impulsivity.  Shar will investigate process for obtaining emotional support animal and relay back to clinic    5/14/19: Shar reports that he is \"doing good overall.\"  Stressed due to being in midst of finals week which he attributes to worsening mood.  No concerns with academics. 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 He believes the Lamictal has been \"evening out his mood.\"  No concerns with impulsivity including sexual promiscuity and excessive spending.  No concerns with sleep.  He is interviewing for summer internship currently.  Continues to take Concerta regularly and finds helpful with management of attention deficits.      2/26/19: Shar reports since starting Lamictal that he feels that mood has leveled out.  He reports irritability and mood elevation has decreased.    He is feeling less of the \"highs.\"  States impulsive spending has ended but does continue to experience some promiscuity.  Overall, he believes that the medication has " "been helpful in managing mood.  Shar has experienced significant stressor including losing friend, discovering partner was cheating, familial stress, and breaking hand.  Also reports experiencing low mood several days since last appointment. 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  He reports increased mood fluctuations since returning to school and increased impulsive behavior (promiscuity and spending money).   He also reports increased irritability almost daily and worsening of depression symptoms.  Shar also reports typically experiencing worsening of mood when winter comes.  Reports needing to sleep each night but struggles to fall asleep due to racing thoughts.  He also reports increased anxiety especially worrying about classes and studies.  Continues to take Concerta and finds helpful.  Does not attribute worsening anxiety to Concerta.  Shar also reports today that he has had thyroid issues in past.      11/8/18: Shar reports that things are \"amazing...really, really good.\"  Currently in midst of midterms and managing stress effectively.  Depression is well managed by Zoloft.  No concerns with weight loss, increased anxiety, or sleep.  Even though Propranolol helpful, will hold due to history of asthma.  Shar will make appointment and W will attempt to contact PCP    6/5/18: Shar reports he successfully completed his sophomore year.  He plans on staying in [place] for summer.  Stopped therapy due to thinking he was not staying in [place] for summer.  He does not plan to continue as he is doing much better in regards to mental health. He does report that he has been sleeping more since school ended and due to not having a schedule.  Shar reports the Sertraline 100mg daily continues to be helpful in managing mood and anxiety.        Recent Symptoms:   Depression:  depressed mood, anhedonia, low energy, appetite " changes and poor concentration /memory  Elevated:  none  Psychosis:  none  Anxiety:  nervous/overwhelmed  Panic Attack:  none  Trauma Related:  none     Recent Substance Use:  no changes reported        Social/ Family History                                  [per patient report]                                 1ea,1ea   FINANCIAL SUPPORT- Full-time student at the [employer]       FEELS SAFE AT HOME- Yes    Medical / Surgical History                                                                                                                There is no problem list on file for this patient.      No past surgical history on file.     Medical Review of Systems                                                                                                    2,10   A comprehensive review of systems was performed and is negative other than noted in the HPI.  SVT (hx).  Asthma.  Allergy                                Nuts; Peas; and Seafood  Current Medications                                                                                                       Current Outpatient Medications   Medication Sig Dispense Refill     albuterol (PROAIR HFA/PROVENTIL HFA/VENTOLIN HFA) 108 (90 BASE) MCG/ACT Inhaler   0     lamoTRIgine (LAMICTAL) 100 MG tablet Take 1 tablet (100 mg) by mouth daily Additional refills will be completed at the appt. 30 tablet 2     methylphenidate (CONCERTA) 36 MG CR tablet Take 1 tablet (36 mg) by mouth every morning 30 tablet 0     methylphenidate (CONCERTA) 36 MG CR tablet Take 1 tablet (36 mg) by mouth every morning 30 tablet 0     methylphenidate (CONCERTA) 36 MG CR tablet Take 1 tablet (36 mg) by mouth every morning 30 tablet 0     propranolol (INDERAL) 20 MG tablet Take 1 tablet (20 mg) by mouth 2 times daily As needed for anxiety       sertraline (ZOLOFT) 100 MG tablet Take 1 tablet (100 mg) by mouth daily 30 tablet 0     Vitals                                                                         "                                               3, 3   /80   Pulse 67   Wt 70.1 kg (154 lb 9.6 oz)    Mental Status Exam                                                                                    9, 14 cog gs     Alertness: alert  and oriented  Appearance: casually groomed  Behavior/Demeanor: cooperative, pleasant and calm, with good  eye contact   Speech: normal  Language: intact  Psychomotor: normal or unremarkable  Mood: \"good\"  Affect: full range; was congruent to mood; was congruent to content  Thought Process/Associations: unremarkable  Thought Content:  Reports none;  Denies suicidal ideation, violent ideation, delusions and paranoid ideation  Perception:  Reports none;  Denies auditory hallucinations and visual hallucinations  Insight: good  Judgment: good  Cognition: (6) does  appear grossly intact; formal cognitive testing was not done  Gait/Station and/or Muscle Strength/Tone: unremarkable    Labs and Data                                                                                                                 Rating Scales:    PHQ9    PHQ9 Today:  9  PHQ-9 SCORE 1/31/2019 2/26/2019 6/7/2019   PHQ-9 Total Score 16 10 8         Diagnosis and Assessment                                                                             m2, h3     Today the following issues were addressed:    1) Major Depressive Disorder, recurrent, moderate  2) Generalized Anxiety Disorder  3) ADHD (Hx)     [place] Prescription [place] [] was checked today:  indicates methylphenidate 30mg filled regularly .     PSYCHOTROPIC DRUG INTERACTIONS: Methylphenidate-Sertraline: Concurrent use of METHYLPHENIDATE and SELECTIVE SEROTONIN REUPTAKE INHIBITORS may result in increased selective serotonin reuptake inhibitor plasma concentrations.  Propranolol-Sertraline: Concurrent use of PROPRANOLOL and SERTRALINE may result in an increased risk of chest pain. .    Plan                                                 "                                                                    m2, h3      1) Management of mood and anxiety  Therapy- Continue  Medications-   Continue Sertraline 100mg daily  Continue Lamictal to 100mg daily  Continue Propranolol 20mg TID PRN for performance anxiety and periodic anxiety due to asthma diagnosis.  Asthma well controlled.     2) Attention management  Medications-   Continue Methylphenidate ER 36mg daily     RTC: 3 months  CRISIS NUMBERS:   Provided routinely in AVS.    Treatment Risk Statement:  The patient understands the risks, benefits, adverse effects and alternatives. Agrees to treatment with the capacity to do so. No medical contraindications to treatment. Agrees to call clinic for any problems. The patient understands to call 911 or go to the nearest ED if life threatening or urgent symptoms occur.     PROVIDER:  JOMAR Palm CNP

## 2019-08-13 NOTE — PATIENT INSTRUCTIONS
Thank you for coming to the PSYCHIATRY CLINIC.    Lab Testing:  If you had lab testing today and your results are reassuring or normal they will be mailed to you or sent through [employer] within 7 days.   If the lab tests need quick action we will call you with the results.  The phone number we will call with results is # [phone] (home) . If this is not the best number please call our clinic and change the number.    Medication Refills:  If you need any refills please call your pharmacy and they will contact us. Our fax number for refills is [phone]. Please allow three business for refill processing.   If you need to  your refill at a new pharmacy, please contact the new pharmacy directly. The new pharmacy will help you get your medications transferred.     Scheduling:  If you have any concerns about today's visit or wish to schedule another appointment please call our office during normal business hours [phone] (8-5:00 M-F)    Contact Us:  Please call [phone] during business hours (8-5:00 M-F).  If after clinic hours, or on the weekend, please call  [phone].    Financial Assistance [phone]  [facility] Billing [phone]  Central Billing Office, [facility]: [phone]  [place] Billing [phone]  Medical Records [phone]      MENTAL HEALTH CRISIS NUMBERS:  [facility]:   [facility] - [phone]   Crisis Residence [facility] - [phone]   Walk-In Counseling [facility] [phone]   COPE 24/7 [place] Mobile Team for Adults - [[phone]]; Child - [[phone]]        [facility]:   [facility] - [phone]   Walk-in counseling [facility] - [phone]   Walk-in counseling [facility] - [phone]   Crisis Residence [facility] - [phone]   Urgent Care Adult Mental Health:   --Drop-in, 24/7 crisis line, and [place] Mobile Team  [[phone]]    CRISIS TEXT LINE: Text [phone] from anywhere, anytime, any crisis 24/7;    OR SEE [email or web address]     Poison Control Center - [phone]    CHILD: [facility] needs assessment team - [phone]     [facility] - [phone]; or [facility] - [phone]    If you have a medical emergency please call 911or go to the nearest ER.                    _____________________________________________    Again thank you for choosing PSYCHIATRY CLINIC and please let us know how we can best partner with you to improve you and your family's health.  You may be receiving a survey in the mail regarding this appointment. We would love to have your feedback, both positive and negative, so please fill out the survey and return it using the provided envelope. The survey is done by an external company, so your answers are anonymous.

## 2019-11-02 DIAGNOSIS — F39 MOOD DISORDER (H): ICD-10-CM

## 2019-11-04 RX ORDER — LAMOTRIGINE 100 MG/1
TABLET ORAL
Qty: 90 TABLET | Refills: 0 | OUTPATIENT
Start: 2019-11-04

## 2019-11-12 ENCOUNTER — OFFICE VISIT (OUTPATIENT)
Dept: PSYCHIATRY | Facility: CLINIC | Age: 24
End: 2019-11-12
Attending: NURSE PRACTITIONER
Payer: COMMERCIAL

## 2019-11-12 VITALS — DIASTOLIC BLOOD PRESSURE: 81 MMHG | HEART RATE: 86 BPM | SYSTOLIC BLOOD PRESSURE: 149 MMHG | WEIGHT: 163 LBS

## 2019-11-12 DIAGNOSIS — F33.1 MAJOR DEPRESSIVE DISORDER, RECURRENT EPISODE, MODERATE (H): ICD-10-CM

## 2019-11-12 DIAGNOSIS — F90.0 ATTENTION DEFICIT HYPERACTIVITY DISORDER, INATTENTIVE TYPE: ICD-10-CM

## 2019-11-12 DIAGNOSIS — F39 MOOD DISORDER (H): ICD-10-CM

## 2019-11-12 PROCEDURE — G0463 HOSPITAL OUTPT CLINIC VISIT: HCPCS | Mod: ZF

## 2019-11-12 RX ORDER — LAMOTRIGINE 100 MG/1
100 TABLET ORAL DAILY
Qty: 30 TABLET | Refills: 2 | Status: SHIPPED | OUTPATIENT
Start: 2019-11-12 | End: 2020-02-07

## 2019-11-12 RX ORDER — METHYLPHENIDATE HYDROCHLORIDE 36 MG/1
36 TABLET ORAL EVERY MORNING
Qty: 30 TABLET | Refills: 0 | Status: SHIPPED | OUTPATIENT
Start: 2019-12-12 | End: 2020-06-10

## 2019-11-12 RX ORDER — METHYLPHENIDATE HYDROCHLORIDE 36 MG/1
36 TABLET ORAL EVERY MORNING
Qty: 30 TABLET | Refills: 0 | Status: SHIPPED | OUTPATIENT
Start: 2019-11-12 | End: 2020-06-10

## 2019-11-12 RX ORDER — METHYLPHENIDATE HYDROCHLORIDE 36 MG/1
36 TABLET ORAL EVERY MORNING
Qty: 30 TABLET | Refills: 0 | Status: SHIPPED | OUTPATIENT
Start: 2020-01-11 | End: 2020-03-10

## 2019-11-12 RX ORDER — SERTRALINE HYDROCHLORIDE 100 MG/1
100 TABLET, FILM COATED ORAL DAILY
Qty: 30 TABLET | Refills: 2 | Status: SHIPPED | OUTPATIENT
Start: 2019-11-12 | End: 2020-03-03

## 2019-11-12 ASSESSMENT — PATIENT HEALTH QUESTIONNAIRE - PHQ9: SUM OF ALL RESPONSES TO PHQ QUESTIONS 1-9: 8

## 2019-11-12 ASSESSMENT — PAIN SCALES - GENERAL: PAINLEVEL: NO PAIN (0)

## 2019-11-12 NOTE — PROGRESS NOTES
"  Psychiatry Clinic Progress Note                                                                   Shar Atkinson is a 24 year old male who returns to the clinic for follow-up care  Therapist:  None  PCP: Behrend, Robert D  Other Providers: None    Pertinent Background:  See previous notes.  Psych critical item history includes suicidal ideation.     Interim History                                                                                                        4, 4     The patient is a good historian, reports good treatment adherence and was last seen 8/13/19.  Since the last visit, Shar was able to have his dog as a support animal which has been extremely helpful.  Overall, \"life is pretty good.\"  He feels that both stress and his ability to manage stress has improved.  Decreased family turmoil.  Both older sister and father have obtained sobriety.  Continues to endorse academic success but will miss class occasionally.  He moved in with his boyfriend and a roommate.  Considering moving to [place] after graduation.  Will use Propranolol primarily for exams.      8/13/19: Shar reports his summer has been \"busy.\"  States he is \"pretty good overall\" and is reacting to events appropriately.  He is facing increased stress including family stress, having to move, and preparing next school year.  Sleeps through the night and no issue falling asleep.  Continues to endorse that Lamictal is managing mood fluctuations and impulsivity.  Shar will investigate process for obtaining emotional support animal and relay back to clinic    5/14/19: Shar reports that he is \"doing good overall.\"  Stressed due to being in midst of finals week which he attributes to worsening mood.  No concerns with academics. 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 He believes the Lamictal has been \"evening out his mood.\"  No concerns with impulsivity including sexual promiscuity and excessive " "spending.  No concerns with sleep.  He is interviewing for summer internship currently.  Continues to take Concerta regularly and finds helpful with management of attention deficits.      2/26/19: Shar reports since starting Lamictal that he feels that mood has leveled out.  He reports irritability and mood elevation has decreased.    He is feeling less of the \"highs.\"  States impulsive spending has ended but does continue to experience some promiscuity.  Overall, he believes that the medication has been helpful in managing mood.  Shar has experienced significant stressor including losing friend, discovering partner was cheating, familial stress, and breaking hand.  Also reports experiencing low mood several days since last appointment. 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  He reports increased mood fluctuations since returning to school and increased impulsive behavior (promiscuity and spending money).   He also reports increased irritability almost daily and worsening of depression symptoms.  Shar also reports typically experiencing worsening of mood when winter comes.  Reports needing to sleep each night but struggles to fall asleep due to racing thoughts.  He also reports increased anxiety especially worrying about classes and studies.  Continues to take Concerta and finds helpful.  Does not attribute worsening anxiety to Concerta.  Shar also reports today that he has had thyroid issues in past.      11/8/18: Shar reports that things are \"amazing...really, really good.\"  Currently in midst of midterms and managing stress effectively.  Depression is well managed by Zoloft.  No concerns with weight loss, increased anxiety, or sleep.  Even though Propranolol helpful, will hold due to history of asthma.  Shar will make appointment and W will attempt to contact PCP    6/5/18: Shar reports he successfully completed " his sophomore year.  He plans on staying in [facility] for summer.  Stopped therapy due to thinking he was not staying in [facility] for summer.  He does not plan to continue as he is doing much better in regards to mental health. He does report that he has been sleeping more since school ended and due to not having a schedule.  Shar reports the Sertraline 100mg daily continues to be helpful in managing mood and anxiety.        Recent Symptoms:   Depression:  depressed mood, low energy, insomnia, appetite changes and poor concentration /memory  Elevated:  none  Psychosis:  none  Anxiety:  nervous/overwhelmed  Panic Attack:  none  Trauma Related:  none     Recent Substance Use:  no changes reported        Social/ Family History                                  [per patient report]                                 1ea,1ea   FINANCIAL SUPPORT- Full-time student at the [employer]       FEELS SAFE AT HOME- Yes    Medical / Surgical History                                                                                                                There is no problem list on file for this patient.      No past surgical history on file.     Medical Review of Systems                                                                                                    2,10   A comprehensive review of systems was performed and is negative other than noted in the HPI.  SVT (hx).  Asthma.  Allergy                                Nuts; Peas; and Seafood  Current Medications                                                                                                       Current Outpatient Medications   Medication Sig Dispense Refill     albuterol (PROAIR HFA/PROVENTIL HFA/VENTOLIN HFA) 108 (90 BASE) MCG/ACT Inhaler   0     lamoTRIgine (LAMICTAL) 100 MG tablet Take 1 tablet (100 mg) by mouth daily Additional refills will be completed at the appt. 30 tablet 2     methylphenidate (CONCERTA) 36 MG CR tablet Take 1 tablet (36 mg) by mouth every  morning 30 tablet 0     methylphenidate (CONCERTA) 36 MG CR tablet Take 1 tablet (36 mg) by mouth every morning 30 tablet 0     methylphenidate (CONCERTA) 36 MG CR tablet Take 1 tablet (36 mg) by mouth every morning 30 tablet 0     propranolol (INDERAL) 20 MG tablet Take 1 tablet (20 mg) by mouth 2 times daily As needed for anxiety 60 tablet 1     sertraline (ZOLOFT) 100 MG tablet Take 1 tablet (100 mg) by mouth daily 30 tablet 2     Vitals                                                                                                                       3, 3   BP (!) 149/81   Pulse 86   Wt 73.9 kg (163 lb)    Mental Status Exam                                                                                    9, 14 cog gs     Alertness: alert  and oriented  Appearance: casually groomed  Behavior/Demeanor: cooperative, pleasant and calm, with good  eye contact   Speech: regular rate and rhythm  Language: no problems  Psychomotor: normal or unremarkable  Mood: description consistent with euthymia  Affect: full range; was congruent to mood; was congruent to content  Thought Process/Associations: unremarkable  Thought Content:  Reports none;  Denies suicidal ideation, violent ideation, delusions and paranoid ideation  Perception:  Reports none;  Denies auditory hallucinations and visual hallucinations  Insight: good  Judgment: good  Cognition: (6) does  appear grossly intact; formal cognitive testing was not done  Gait/Station and/or Muscle Strength/Tone: unremarkable    Labs and Data                                                                                                                 Rating Scales:    PHQ9    PHQ9 Today:  8  PHQ-9 SCORE 2/26/2019 6/7/2019 8/13/2019   PHQ-9 Total Score 10 8 9         Diagnosis and Assessment                                                                             m2, h3     Today the following issues were addressed:    1) Major Depressive Disorder, recurrent,  moderate  2) Generalized Anxiety Disorder  3) ADHD (Hx)     [place] Prescription Monitoring [place] [] was checked today:  indicates methylphenidate 30mg filled regularly .     PSYCHOTROPIC DRUG INTERACTIONS: Methylphenidate-Sertraline: Concurrent use of METHYLPHENIDATE and SELECTIVE SEROTONIN REUPTAKE INHIBITORS may result in increased selective serotonin reuptake inhibitor plasma concentrations.  Propranolol-Sertraline: Concurrent use of PROPRANOLOL and SERTRALINE may result in an increased risk of chest pain. .    Plan                                                                                                                    m2, h3      1) Management of mood and anxiety  Therapy- Continue  Medications-   Continue Sertraline 100mg daily  Continue Lamictal to 100mg daily  Continue Propranolol 20mg TID PRN for performance anxiety and periodic anxiety due to asthma diagnosis.  Asthma well controlled.     2) Attention management  Medications-   Continue Methylphenidate ER 36mg daily     RTC: 3 months  CRISIS NUMBERS:   Provided routinely in AVS.    Treatment Risk Statement:  The patient understands the risks, benefits, adverse effects and alternatives. Agrees to treatment with the capacity to do so. No medical contraindications to treatment. Agrees to call clinic for any problems. The patient understands to call 911 or go to the nearest ED if life threatening or urgent symptoms occur.     PROVIDER:  JOMAR Palm CNP

## 2019-12-09 DIAGNOSIS — F33.1 MAJOR DEPRESSIVE DISORDER, RECURRENT EPISODE, MODERATE (H): ICD-10-CM

## 2019-12-10 RX ORDER — SERTRALINE HYDROCHLORIDE 100 MG/1
TABLET, FILM COATED ORAL
Qty: 90 TABLET | Refills: 0 | OUTPATIENT
Start: 2019-12-10

## 2020-01-09 ENCOUNTER — TELEPHONE (OUTPATIENT)
Dept: PSYCHIATRY | Facility: CLINIC | Age: 25
End: 2020-01-09

## 2020-01-09 NOTE — TELEPHONE ENCOUNTER
Received RF request from patient     Last seen: 11/12/2019  RTC: 3 months  Cancel: none  No-show: none   Next appt: none scheduled     Medication requested: methylphenidate (CONCERTA) 36 MG CR tablet  Directions: Take 1 tablet (36 mg) by mouth every morning   Qty: 30  Last Rx written 12/12/2019  [place] on 12/10 (Rx dated 11/12), 11/06, (Rx 8/13), 10/9 (Rx 8/13)       Plan per 11/12 office visit:    Attention management  Medications-   Continue Methylphenidate ER 36mg daily     Patient reported that he talked with the pharmacy ([facility] on [address] and was told that he does not have any refills on Concerta.  Per med tab, there is a refill dated 1/11/2020 that was sent to the pharmacy on 11/12.  Per [place] , Rx written on 11/12 that was scheduled to be filled on 12/12 does not appear to have been filled yet.  Called the pharmacy and the pharmacist confirmed that he still has 2 refills on file.  Contacted Shar to find out if he is okay with getting his refill from the [place] pharmacy today, or if he would prefer to transfer it to the one in [facility].  He said that he can pick it up from there at this time, but he is (understandably) perplexed about them telling him earlier today that he does not have refills.  Called the pharmacy to ask them to get the refill prepared and they will text patient to let him know when it is ready.    For future refills, he would like to use [facility] in [facility], [address] [place] [address].      No further action needed.  Routed to provider for FYI.

## 2020-02-05 DIAGNOSIS — F39 MOOD DISORDER (H): ICD-10-CM

## 2020-02-07 RX ORDER — LAMOTRIGINE 100 MG/1
100 TABLET ORAL DAILY
Qty: 30 TABLET | Refills: 0 | Status: SHIPPED | OUTPATIENT
Start: 2020-02-07 | End: 2020-03-04

## 2020-02-07 NOTE — TELEPHONE ENCOUNTER
Medication requested: LAMOTRIGINE 100 MG TABLET  Last refilled: 11/12/19  Qty: 90      Last seen: 11/12/19  RTC: 3 months  Cancel: 0  No-show: 0  Next appt: 0    Refill decision:   30 day kim refill sent to the pharmacy - including instructions for patient to call the clinic and schedule an appointment.  Scheduling has been notified to contact the pt for appointment.

## 2020-03-01 DIAGNOSIS — F33.1 MAJOR DEPRESSIVE DISORDER, RECURRENT EPISODE, MODERATE (H): ICD-10-CM

## 2020-03-02 DIAGNOSIS — F39 MOOD DISORDER (H): ICD-10-CM

## 2020-03-03 RX ORDER — SERTRALINE HYDROCHLORIDE 100 MG/1
100 TABLET, FILM COATED ORAL DAILY
Qty: 30 TABLET | Refills: 0 | Status: SHIPPED | OUTPATIENT
Start: 2020-03-03 | End: 2020-03-10

## 2020-03-03 NOTE — TELEPHONE ENCOUNTER
sertraline (ZOLOFT) 100 MG   Last refilled: 11/12/19  Qty: 30  :  2  * Continue Sertraline 100mg daily    Last seen: 11/12/19   RTC: 3 MOS  Cancel: 0  No-show: 0  Next appt: NONE  Refill decision: 30 day kim refill sent to the pharmacy - including instructions for patient to call the clinic and schedule an appointment.  Scheduling has been notified to contact the pt for appointment.    Will send FYI to provider as is outside RTC timeframe

## 2020-03-04 RX ORDER — LAMOTRIGINE 100 MG/1
100 TABLET ORAL DAILY
Qty: 30 TABLET | Refills: 0 | Status: SHIPPED | OUTPATIENT
Start: 2020-03-04 | End: 2020-03-10

## 2020-03-04 NOTE — TELEPHONE ENCOUNTER
lamoTRIgine (LAMICTAL) 100 MG   Last refilled: 2/7/20  Qty: 30  * Continue Lamictal to 100mg daily    Last seen: 11/12/19  RTC: 3 MOS  Cancel: 0     No-show: 0  Next appt: 3/10/20  Refill decision: Refilled for 30 days per protocol.  Will send FYI to provider as is outside RTC timeframe.

## 2020-03-10 ENCOUNTER — OFFICE VISIT (OUTPATIENT)
Dept: PSYCHIATRY | Facility: CLINIC | Age: 25
End: 2020-03-10
Attending: NURSE PRACTITIONER
Payer: COMMERCIAL

## 2020-03-10 VITALS — DIASTOLIC BLOOD PRESSURE: 88 MMHG | HEART RATE: 76 BPM | SYSTOLIC BLOOD PRESSURE: 136 MMHG | WEIGHT: 171 LBS

## 2020-03-10 DIAGNOSIS — F90.0 ATTENTION DEFICIT HYPERACTIVITY DISORDER, INATTENTIVE TYPE: ICD-10-CM

## 2020-03-10 DIAGNOSIS — F33.1 MAJOR DEPRESSIVE DISORDER, RECURRENT EPISODE, MODERATE (H): ICD-10-CM

## 2020-03-10 DIAGNOSIS — F39 MOOD DISORDER (H): ICD-10-CM

## 2020-03-10 PROCEDURE — G0463 HOSPITAL OUTPT CLINIC VISIT: HCPCS | Mod: ZF

## 2020-03-10 RX ORDER — SERTRALINE HYDROCHLORIDE 100 MG/1
150 TABLET, FILM COATED ORAL DAILY
Qty: 30 TABLET | Refills: 0 | Status: SHIPPED | OUTPATIENT
Start: 2020-03-10 | End: 2020-04-07

## 2020-03-10 RX ORDER — LAMOTRIGINE 100 MG/1
100 TABLET ORAL DAILY
Qty: 30 TABLET | Refills: 0 | Status: SHIPPED | OUTPATIENT
Start: 2020-03-10 | End: 2020-04-07

## 2020-03-10 RX ORDER — METHYLPHENIDATE HYDROCHLORIDE 36 MG/1
36 TABLET ORAL EVERY MORNING
Qty: 30 TABLET | Refills: 0 | Status: SHIPPED | OUTPATIENT
Start: 2020-03-10 | End: 2020-04-07

## 2020-03-10 ASSESSMENT — PAIN SCALES - GENERAL: PAINLEVEL: NO PAIN (0)

## 2020-03-10 NOTE — PATIENT INSTRUCTIONS
Thank you for coming to the PSYCHIATRY CLINIC.    Lab Testing:  If you had lab testing today and your results are reassuring or normal they will be mailed to you or sent through [facility] within 7 days.   If the lab tests need quick action we will call you with the results.  The phone number we will call with results is # [phone] (home) . If this is not the best number please call our clinic and change the number.    Medication Refills:  If you need any refills please call your pharmacy and they will contact us. Our fax number for refills is [phone]. Please allow three business for refill processing.   If you need to  your refill at a new pharmacy, please contact the new pharmacy directly. The new pharmacy will help you get your medications transferred.     Scheduling:  If you have any concerns about today's visit or wish to schedule another appointment please call our office during normal business hours [phone] (8-5:00 M-F)    Contact Us:  Please call [phone] during business hours (8-5:00 M-F).  If after clinic hours, or on the weekend, please call  [phone].    Financial Assistance [phone]  [facility] Billing [phone]  Central Billing Office, [facility]: [phone]  [facility] Billing [phone]  Medical Records [phone]      MENTAL HEALTH CRISIS NUMBERS:  [facility]:   [facility] - [phone]   Crisis Residence [facility] - [phone]   Walk-In Counseling [facility] [phone]   COPE 24/7 [place] Mobile Team for Adults - [[phone]]; Child - [[phone]]        [facility]:   [facility] - [phone]   Walk-in counseling [facility] - [phone]   Walk-in counseling [facility] - [phone]   Crisis Residence [facility] - [phone]   Urgent Care Adult Mental Health:   --Drop-in, 24/7 crisis line, and [place] Mobile Team  [[phone]]    CRISIS TEXT LINE: Text [phone] from anywhere, anytime, any crisis 24/7;    OR SEE www.crisistextline.org     National Suicide Prevention Lifeline: 303-776-[facility] (598-422-7376)    Poison Control Center - 3-505-850-9313    CHILD: [facility] needs assessment team - [phone]     [facility] Lifeline - 1-722.119.3299; or [facility] Lifeline - 1-602.855.3971    If you have a medical emergency please call 911or go to the nearest ER.                    _____________________________________________    Again thank you for choosing PSYCHIATRY CLINIC and please let us know how we can best partner with you to improve you and your family's health.  You may be receiving a survey regarding this appointment. We would love to have your feedback, both positive and negative. The survey is done by an external company, so your answers are anonymous.

## 2020-03-10 NOTE — PROGRESS NOTES
"  Psychiatry Clinic Progress Note                                                                   Shar Atkinson is a 24 year old male who returns to the clinic for follow-up care  Therapist:  None  PCP: Behrend, Robert D  Other Providers: None    Pertinent Background:  See previous notes.  Psych critical item history includes suicidal ideation.     Interim History                                                                                                        4, 4     The patient is a good historian, reports good treatment adherence and was last seen 11/12/19.  Since the last visit,  Shar reports he did struggle with depression during month of February.  He reports his mood typically lowers during winter months but duration this season seems longer.  Feels a like a failure due to getting D in class last semester which has led to delaying graduations.  Endorses anhedonia, psychomotor slowing, lack of motivation, and decreased sleep.  Has been using melatonin 5mg and finds helpful.  Lamictal continues to be helpful in management of irritability and mood fluctuations.  Advised to see therapist.  Gave contact information to [facility]    11/12/19: Shar was able to have his dog as a support animal which has been extremely helpful.  Overall, \"life is pretty good.\"  He feels that both stress and his ability to manage stress has improved.  Decreased family turmoil.  Both older sister and father have obtained sobriety.  Continues to endorse academic success but will miss class occasionally.  He moved in with his boyfriend and a roommate.  Considering moving to [place] after graduation.  Will use Propranolol primarily for exams.      8/13/19: Shar reports his summer has been \"busy.\"  States he is \"pretty good overall\" and is reacting to events appropriately.  He is facing increased stress including family stress, having to move, and preparing next school year.  Sleeps through the " "night and no issue falling asleep.  Continues to endorse that Lamictal is managing mood fluctuations and impulsivity.  Shar will investigate process for obtaining emotional support animal and relay back to clinic    5/14/19: Shar reports that he is \"doing good overall.\"  Stressed due to being in midst of finals week which he attributes to worsening mood.  No concerns with academics. 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 He believes the Lamictal has been \"evening out his mood.\"  No concerns with impulsivity including sexual promiscuity and excessive spending.  No concerns with sleep.  He is interviewing for summer internship currently.  Continues to take Concerta regularly and finds helpful with management of attention deficits.      2/26/19: Shar reports since starting Lamictal that he feels that mood has leveled out.  He reports irritability and mood elevation has decreased.    He is feeling less of the \"highs.\"  States impulsive spending has ended but does continue to experience some promiscuity.  Overall, he believes that the medication has been helpful in managing mood.  Shar has experienced significant stressor including losing friend, discovering partner was cheating, familial stress, and breaking hand.  Also reports experiencing low mood several days since last appointment. 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  He reports increased mood fluctuations since returning to school and increased impulsive behavior (promiscuity and spending money).   He also reports increased irritability almost daily and worsening of depression symptoms.  Shar also reports typically experiencing worsening of mood when winter comes.  Reports needing to sleep each night but struggles to fall asleep due to racing thoughts.  He also reports increased anxiety especially worrying about classes and " "studies.  Continues to take Concerta and finds helpful.  Does not attribute worsening anxiety to Concerta.  Shar also reports today that he has had thyroid issues in past.      11/8/18: Shar reports that things are \"amazing...really, really good.\"  Currently in midst of midterms and managing stress effectively.  Depression is well managed by Zoloft.  No concerns with weight loss, increased anxiety, or sleep.  Even though Propranolol helpful, will hold due to history of asthma.  Shar will make appointment and W will attempt to contact PCP    6/5/18: Shar reports he successfully completed his sophomore year.  He plans on staying in [facility] for summer.  Stopped therapy due to thinking he was not staying in [place] for summer.  He does not plan to continue as he is doing much better in regards to mental health. He does report that he has been sleeping more since school ended and due to not having a schedule.  Shar reports the Sertraline 100mg daily continues to be helpful in managing mood and anxiety.        Recent Symptoms:   Depression:  depressed mood, low energy, insomnia, appetite changes, poor concentration /memory and feeling worthless  Elevated:  none  Psychosis:  none  Anxiety:  nervous/overwhelmed  Panic Attack:  none  Trauma Related:  none     Recent Substance Use:  no changes reported        Social/ Family History                                  [per patient report]                                 1ea,1ea   FINANCIAL SUPPORT- Full-time student at the [employer]       FEELS SAFE AT HOME- Yes    Medical / Surgical History                                                                                                                There is no problem list on file for this patient.      No past surgical history on file.     Medical Review of Systems                                                                                                    2,10   A comprehensive review of systems was performed and is " negative other than noted in the HPI.  SVT (hx).  Asthma.  Allergy                                Nuts; Peas; and Seafood  Current Medications                                                                                                       Current Outpatient Medications   Medication Sig Dispense Refill     albuterol (PROAIR HFA/PROVENTIL HFA/VENTOLIN HFA) 108 (90 BASE) MCG/ACT Inhaler   0     lamoTRIgine (LAMICTAL) 100 MG tablet TAKE 1 TABLET (100 MG) BY MOUTH DAILY FOR MORE REFILLS,SCHEDULE AN APPOINTMENT -[id number] 30 tablet 0     methylphenidate (CONCERTA) 36 MG CR tablet Take 1 tablet (36 mg) by mouth every morning 30 tablet 0     methylphenidate (CONCERTA) 36 MG CR tablet Take 1 tablet (36 mg) by mouth every morning 30 tablet 0     methylphenidate (CONCERTA) 36 MG CR tablet Take 1 tablet (36 mg) by mouth every morning 30 tablet 0     propranolol (INDERAL) 20 MG tablet Take 1 tablet (20 mg) by mouth 2 times daily As needed for anxiety 60 tablet 1     sertraline (ZOLOFT) 100 MG tablet Take 1 tablet (100 mg) by mouth daily ** Call clinic [phone]  to schedule follow up APPT 30 tablet 0     Vitals                                                                                                                       3, 3   /88   Pulse 76   Wt 77.6 kg (171 lb)    Mental Status Exam                                                                                    9, 14 cog gs     Alertness: alert  and oriented  Appearance: casually groomed  Behavior/Demeanor: cooperative, pleasant and calm, with good  eye contact   Speech: normal  Language: no problems  Psychomotor: normal or unremarkable  Mood: depressed  Affect: full range; was congruent to mood; was congruent to content  Thought Process/Associations: unremarkable  Thought Content:  Reports none;  Denies suicidal ideation, violent ideation, delusions and paranoid ideation  Perception:  Reports none;  Denies auditory hallucinations and visual  hallucinations  Insight: good  Judgment: good  Cognition: (6) does  appear grossly intact; formal cognitive testing was not done  Gait/Station and/or Muscle Strength/Tone: unremarkable    Labs and Data                                                                                                                 Rating Scales:    PHQ9    PHQ9 Today:    PHQ-9 SCORE 6/7/2019 8/13/2019 11/12/2019   PHQ-9 Total Score 8 9 8         Diagnosis and Assessment                                                                             m2, h3     Today the following issues were addressed:    1) Major Depressive Disorder, recurrent, moderate  2) Generalized Anxiety Disorder  3) ADHD (Hx)     [place] Prescription Monitoring Program [] was checked today:  indicates methylphenidate 30mg filled regularly .     PSYCHOTROPIC DRUG INTERACTIONS: Methylphenidate-Sertraline: Concurrent use of METHYLPHENIDATE and SELECTIVE SEROTONIN REUPTAKE INHIBITORS may result in increased selective serotonin reuptake inhibitor plasma concentrations.  Propranolol-Sertraline: Concurrent use of PROPRANOLOL and SERTRALINE may result in an increased risk of chest pain. .    Plan                                                                                                                    m2, h3      1) Management of mood and anxiety  Therapy- Continue  Medications-   Increase Sertraline to 150mg daily  Continue Lamictal to 100mg daily  Continue Propranolol 20mg TID PRN for performance anxiety and periodic anxiety due to asthma diagnosis.  Asthma well controlled.     2) Attention management  Medications-   Continue Methylphenidate ER 36mg daily     RTC: 1 month  CRISIS NUMBERS:   Provided routinely in AVS.    Treatment Risk Statement:  The patient understands the risks, benefits, adverse effects and alternatives. Agrees to treatment with the capacity to do so. No medical contraindications to treatment. Agrees to call clinic for any problems. The patient  understands to call 911 or go to the nearest ED if life threatening or urgent symptoms occur.     PROVIDER:  JOMAR Palm CNP

## 2020-04-04 DIAGNOSIS — F39 MOOD DISORDER (H): ICD-10-CM

## 2020-04-07 ENCOUNTER — VIRTUAL VISIT (OUTPATIENT)
Dept: PSYCHIATRY | Facility: CLINIC | Age: 25
End: 2020-04-07
Attending: NURSE PRACTITIONER
Payer: COMMERCIAL

## 2020-04-07 ENCOUNTER — TELEPHONE (OUTPATIENT)
Dept: PSYCHIATRY | Facility: CLINIC | Age: 25
End: 2020-04-07

## 2020-04-07 DIAGNOSIS — F39 MOOD DISORDER (H): ICD-10-CM

## 2020-04-07 DIAGNOSIS — F90.0 ATTENTION DEFICIT HYPERACTIVITY DISORDER, INATTENTIVE TYPE: ICD-10-CM

## 2020-04-07 DIAGNOSIS — F33.1 MAJOR DEPRESSIVE DISORDER, RECURRENT EPISODE, MODERATE (H): ICD-10-CM

## 2020-04-07 RX ORDER — SERTRALINE HYDROCHLORIDE 100 MG/1
150 TABLET, FILM COATED ORAL DAILY
Qty: 45 TABLET | Refills: 1 | Status: SHIPPED | OUTPATIENT
Start: 2020-04-07 | End: 2020-06-03

## 2020-04-07 RX ORDER — METHYLPHENIDATE HYDROCHLORIDE 36 MG/1
36 TABLET ORAL EVERY MORNING
Qty: 30 TABLET | Refills: 0 | Status: SHIPPED | OUTPATIENT
Start: 2020-05-07 | End: 2020-06-10

## 2020-04-07 RX ORDER — LAMOTRIGINE 100 MG/1
100 TABLET ORAL DAILY
Qty: 30 TABLET | Refills: 1 | Status: SHIPPED | OUTPATIENT
Start: 2020-04-07 | End: 2020-06-03

## 2020-04-07 RX ORDER — METHYLPHENIDATE HYDROCHLORIDE 36 MG/1
36 TABLET ORAL EVERY MORNING
Qty: 30 TABLET | Refills: 0 | Status: SHIPPED | OUTPATIENT
Start: 2020-04-07 | End: 2020-06-10

## 2020-04-07 NOTE — TELEPHONE ENCOUNTER
On 4/7/2020, at 1309, writer called patient at mobile phone to confirm Virtual Visit. Writer unable to make contact with patient. Writer left detailed voice message for callback. [phone], left as call back number. ALCIDES Bender, EMT

## 2020-04-07 NOTE — PROGRESS NOTES
"  Psychiatry Clinic Progress Note                                                                   Shar Atkinson is a 24 year old male who returns to the clinic for follow-up care  Therapist:  None  PCP: Behrend, Robert D  Other Providers: None    Pertinent Background:  See previous notes.  Psych critical item history includes suicidal ideation.     Interim History                                                                                                        4, 4     The patient is a good historian, reports good treatment adherence and was last seen 3/10/20.  Since the last visit,  Shar reports Covid 19 associated lifestyle changes have impacted mood but increase in Zoloft has been helpful.  No concerns with mood dysregulation and irritability.  Sleep also not a concern.    3/10/20: Shar reports he did struggle with depression during month of February.  He reports his mood typically lowers during winter months but duration this season seems longer.  Feels a like a failure due to getting D in class last semester which has led to delaying graduations.  Endorses anhedonia, psychomotor slowing, lack of motivation, and decreased sleep.  Has been using melatonin 5mg and finds helpful.  Lamictal continues to be helpful in management of irritability and mood fluctuations.  Advised to see therapist.  Gave contact information to [facility]    11/12/19: Shar was able to have his dog as a support animal which has been extremely helpful.  Overall, \"life is pretty good.\"  He feels that both stress and his ability to manage stress has improved.  Decreased family turmoil.  Both older sister and father have obtained sobriety.  Continues to endorse academic success but will miss class occasionally.  He moved in with his boyfriend and a roommate.  Considering moving to [place] after graduation.  Will use Propranolol primarily for exams.      8/13/19: Shar reports his summer has been " "\"busy.\"  States he is \"pretty good overall\" and is reacting to events appropriately.  He is facing increased stress including family stress, having to move, and preparing next school year.  Sleeps through the night and no issue falling asleep.  Continues to endorse that Lamictal is managing mood fluctuations and impulsivity.  Shar will investigate process for obtaining emotional support animal and relay back to clinic    5/14/19: Shar reports that he is \"doing good overall.\"  Stressed due to being in midst of finals week which he attributes to worsening mood.  No concerns with academics. 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 He believes the Lamictal has been \"evening out his mood.\"  No concerns with impulsivity including sexual promiscuity and excessive spending.  No concerns with sleep.  He is interviewing for summer internship currently.  Continues to take Concerta regularly and finds helpful with management of attention deficits.      2/26/19: Shar reports since starting Lamictal that he feels that mood has leveled out.  He reports irritability and mood elevation has decreased.    He is feeling less of the \"highs.\"  States impulsive spending has ended but does continue to experience some promiscuity.  Overall, he believes that the medication has been helpful in managing mood.  Shar has experienced significant stressor including losing friend, discovering partner was cheating, familial stress, and breaking hand.  Also reports experiencing low mood several days since last appointment. 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  He reports increased mood fluctuations since returning to school and increased impulsive behavior (promiscuity and spending money).   He also reports increased irritability almost daily and worsening of depression symptoms.  Shar also reports typically " "experiencing worsening of mood when winter comes.  Reports needing to sleep each night but struggles to fall asleep due to racing thoughts.  He also reports increased anxiety especially worrying about classes and studies.  Continues to take Concerta and finds helpful.  Does not attribute worsening anxiety to Concerta.  Shar also reports today that he has had thyroid issues in past.      11/8/18: Shar reports that things are \"amazing...really, really good.\"  Currently in midst of midterms and managing stress effectively.  Depression is well managed by Zoloft.  No concerns with weight loss, increased anxiety, or sleep.  Even though Propranolol helpful, will hold due to history of asthma.  Shar will make appointment and W will attempt to contact PCP    6/5/18: Shar reports he successfully completed his sophomore year.  He plans on staying in [facility] for summer.  Stopped therapy due to thinking he was not staying in [place] [facility] for summer.  He does not plan to continue as he is doing much better in regards to mental health. He does report that he has been sleeping more since school ended and due to not having a schedule.  Shar reports the Sertraline 100mg daily continues to be helpful in managing mood and anxiety.        Recent Symptoms:   Depression:  depressed mood and anhedonia  Elevated:  none  Psychosis:  none  Anxiety:  periodic worry  Panic Attack:  none  Trauma Related:  none     Recent Substance Use:  no changes reported        Social/ Family History                                  [per patient report]                                 1ea,1ea   FINANCIAL SUPPORT- Full-time student at the [employer]       FEELS SAFE AT HOME- Yes    Medical / Surgical History                                                                                                                There is no problem list on file for this patient.      No past surgical history on file.     Medical Review of Systems                                    " "                                                                2,10   A comprehensive review of systems was performed and is negative other than noted in the HPI.  SVT (hx).  Asthma.  Allergy                                Nuts; Peas; and Seafood  Current Medications                                                                                                       Current Outpatient Medications   Medication Sig Dispense Refill     albuterol (PROAIR HFA/PROVENTIL HFA/VENTOLIN HFA) 108 (90 BASE) MCG/ACT Inhaler   0     lamoTRIgine (LAMICTAL) 100 MG tablet Take 1 tablet (100 mg) by mouth daily 30 tablet 0     methylphenidate (CONCERTA) 36 MG CR tablet Take 1 tablet (36 mg) by mouth every morning 30 tablet 0     methylphenidate (CONCERTA) 36 MG CR tablet Take 1 tablet (36 mg) by mouth every morning 30 tablet 0     methylphenidate (CONCERTA) 36 MG CR tablet Take 1 tablet (36 mg) by mouth every morning 30 tablet 0     propranolol (INDERAL) 20 MG tablet Take 1 tablet (20 mg) by mouth 2 times daily As needed for anxiety 60 tablet 1     sertraline (ZOLOFT) 100 MG tablet Take 1.5 tablets (150 mg) by mouth daily 30 tablet 0     Vitals                                                                                                                       3, 3   There were no vitals taken for this visit.   Mental Status Exam                                                                                    9, 14 cog gs     Alertness: alert  and oriented  Appearance: unable to assess  Behavior/Demeanor: cooperative and pleasant, with unable to assess eye contact   Speech: normal  Language: no problems  Psychomotor: unable to assess  Mood: 'ok\"  Affect: unable to assess; was congruent to mood; was congruent to content  Thought Process/Associations: unremarkable  Thought Content:  Reports none;  Denies suicidal ideation, violent ideation, delusions and paranoid ideation  Perception:  Reports none;  Denies auditory hallucinations " and visual hallucinations  Insight: good  Judgment: good  Cognition: (6) does  appear grossly intact; formal cognitive testing was not done  Gait/Station and/or Muscle Strength/Tone: unable to assess    Labs and Data                                                                                                                 Rating Scales:    PHQ9    PHQ9 Today:  Not completed  PHQ-9 SCORE 6/7/2019 8/13/2019 11/12/2019   PHQ-9 Total Score 8 9 8         Diagnosis and Assessment                                                                             m2, h3     Today the following issues were addressed:    1) Major Depressive Disorder, recurrent, moderate  2) Generalized Anxiety Disorder  3) ADHD (Hx)     [place] Prescription Monitoring [place] [] was checked today:  indicates methylphenidate 30mg filled regularly .     PSYCHOTROPIC DRUG INTERACTIONS: Methylphenidate-Sertraline: Concurrent use of METHYLPHENIDATE and SELECTIVE SEROTONIN REUPTAKE INHIBITORS may result in increased selective serotonin reuptake inhibitor plasma concentrations.  Propranolol-Sertraline: Concurrent use of PROPRANOLOL and SERTRALINE may result in an increased risk of chest pain. .    Plan                                                                                                                    m2, h3      1) Management of mood and anxiety  Therapy- Continue  Medications-   Continue Sertraline to 150mg daily  Continue Lamictal to 100mg daily  Continue Propranolol 20mg TID PRN for performance anxiety and periodic anxiety due to asthma diagnosis.  Asthma well controlled.     2) Attention management  Medications-   Continue Methylphenidate ER 36mg daily     RTC: 2 months  CRISIS NUMBERS:   Provided routinely in AVS.    Treatment Risk Statement:  The patient understands the risks, benefits, adverse effects and alternatives. Agrees to treatment with the capacity to do so. No medical contraindications to treatment. Agrees to call clinic  for any problems. The patient understands to call 911 or go to the nearest ED if life threatening or urgent symptoms occur.     PROVIDER:  JOMAR Palm CNP    TELEPHONE VISIT  Shar Atkinson is a 24 year old pt. who is being evaluated via a billable telephone visit.      The patient has been notified of the following:    We have found that certain health care needs can be provided without the need for a physical exam. This service lets us provide the care you need with a short phone conversation. If a prescription is necessary we can send it directly to your pharmacy. If lab work is needed we can place an order for that and you can then stop by our lab to have the test done at a later time. Insurers are generally covering virtual visits as they would in-office visits so billing should not be different than normal.  If for some reason you do get billed incorrectly, you should contact the billing office to correct it and that number is in the AVS .    Patient has given verbal consent for a telephone visit?:  Yes   How would the pt like to obtain the AVS?:  not requested  AVS SmartPhrase [PsychAVS] has been placed in 'Patient Instructions':  unknown     Start Time:  2:45pm          End Time:  2:58pm

## 2020-04-07 NOTE — TELEPHONE ENCOUNTER
Medication requested: LAMOTRIGINE 100 MG TABLET          Last seen:  4/7/20- medication reordered@ time of visit> #30/ 1 rfl

## 2020-04-08 RX ORDER — LAMOTRIGINE 100 MG/1
TABLET ORAL
Qty: 30 TABLET | Refills: 0 | OUTPATIENT
Start: 2020-04-08

## 2020-05-03 DIAGNOSIS — F39 MOOD DISORDER (H): ICD-10-CM

## 2020-05-03 DIAGNOSIS — F33.1 MAJOR DEPRESSIVE DISORDER, RECURRENT EPISODE, MODERATE (H): ICD-10-CM

## 2020-05-04 RX ORDER — LAMOTRIGINE 100 MG/1
TABLET ORAL
Qty: 30 TABLET | Refills: 1 | OUTPATIENT
Start: 2020-05-04

## 2020-05-04 RX ORDER — SERTRALINE HYDROCHLORIDE 100 MG/1
TABLET, FILM COATED ORAL
Qty: 45 TABLET | Refills: 1 | OUTPATIENT
Start: 2020-05-04

## 2020-06-03 DIAGNOSIS — F33.1 MAJOR DEPRESSIVE DISORDER, RECURRENT EPISODE, MODERATE (H): ICD-10-CM

## 2020-06-03 DIAGNOSIS — F39 MOOD DISORDER (H): ICD-10-CM

## 2020-06-03 RX ORDER — SERTRALINE HYDROCHLORIDE 100 MG/1
150 TABLET, FILM COATED ORAL DAILY
Qty: 45 TABLET | Refills: 0 | Status: SHIPPED | OUTPATIENT
Start: 2020-06-03 | End: 2020-06-10

## 2020-06-03 RX ORDER — LAMOTRIGINE 100 MG/1
100 TABLET ORAL DAILY
Qty: 30 TABLET | Refills: 0 | Status: SHIPPED | OUTPATIENT
Start: 2020-06-03 | End: 2020-06-10

## 2020-06-03 NOTE — TELEPHONE ENCOUNTER
Medication requested: LAMOTRIGINE 100 MG TABLET   Last refilled: 4-7-20  Qty: 30:1    Medication requested: SERTRALINE  MG TABLET   Last refilled: 4-7-20  Qty: 45:1      Last seen: 4-7-20  RTC: 2 Months  Cancel: 0  No-show: 0  Next appt: none    Refill decision:   30 day kim refill sent to the pharmacy - including instructions for patient to call the clinic and schedule an appointment.    Scheduling has been notified to contact the pt for appointment.

## 2020-06-09 ENCOUNTER — TELEPHONE (OUTPATIENT)
Dept: PSYCHIATRY | Facility: CLINIC | Age: 25
End: 2020-06-09

## 2020-06-09 NOTE — TELEPHONE ENCOUNTER
Attempted to reach patient twice regarding appointment.  Both calls immediately went to voicemail.  Advised him to call clinic to reschedule.

## 2020-06-10 ENCOUNTER — VIRTUAL VISIT (OUTPATIENT)
Dept: PSYCHIATRY | Facility: CLINIC | Age: 25
End: 2020-06-10
Attending: NURSE PRACTITIONER
Payer: COMMERCIAL

## 2020-06-10 ENCOUNTER — TELEPHONE (OUTPATIENT)
Dept: PSYCHIATRY | Facility: CLINIC | Age: 25
End: 2020-06-10

## 2020-06-10 DIAGNOSIS — F90.0 ATTENTION DEFICIT HYPERACTIVITY DISORDER, INATTENTIVE TYPE: ICD-10-CM

## 2020-06-10 DIAGNOSIS — F39 MOOD DISORDER (H): ICD-10-CM

## 2020-06-10 DIAGNOSIS — F33.1 MAJOR DEPRESSIVE DISORDER, RECURRENT EPISODE, MODERATE (H): ICD-10-CM

## 2020-06-10 RX ORDER — LAMOTRIGINE 100 MG/1
100 TABLET ORAL DAILY
Qty: 30 TABLET | Refills: 0 | Status: SHIPPED | OUTPATIENT
Start: 2020-06-10 | End: 2020-07-07

## 2020-06-10 RX ORDER — SERTRALINE HYDROCHLORIDE 100 MG/1
150 TABLET, FILM COATED ORAL DAILY
Qty: 45 TABLET | Refills: 0 | Status: SHIPPED | OUTPATIENT
Start: 2020-06-10 | End: 2020-06-10

## 2020-06-10 RX ORDER — SERTRALINE HYDROCHLORIDE 100 MG/1
150 TABLET, FILM COATED ORAL DAILY
Qty: 45 TABLET | Refills: 0 | Status: SHIPPED | OUTPATIENT
Start: 2020-06-10 | End: 2020-07-07

## 2020-06-10 RX ORDER — METHYLPHENIDATE HYDROCHLORIDE 54 MG/1
54 TABLET ORAL EVERY MORNING
Qty: 30 TABLET | Refills: 0 | Status: SHIPPED | OUTPATIENT
Start: 2020-06-10 | End: 2020-07-13

## 2020-06-10 RX ORDER — METHYLPHENIDATE HYDROCHLORIDE 54 MG/1
54 TABLET ORAL EVERY MORNING
Qty: 30 TABLET | Refills: 0 | Status: SHIPPED | OUTPATIENT
Start: 2020-06-10 | End: 2020-06-10

## 2020-06-10 RX ORDER — LAMOTRIGINE 100 MG/1
100 TABLET ORAL DAILY
Qty: 30 TABLET | Refills: 0 | Status: SHIPPED | OUTPATIENT
Start: 2020-06-10 | End: 2020-06-10

## 2020-06-10 ASSESSMENT — PAIN SCALES - GENERAL: PAINLEVEL: NO PAIN (0)

## 2020-06-10 NOTE — PATIENT INSTRUCTIONS
Thank you for coming to the PSYCHIATRY CLINIC.    Lab Testing:  If you had lab testing today and your results are reassuring or normal they will be mailed to you or sent through Cl[facility]! within 7 days. If the lab tests need quick action we will call you with the results. The phone number we will call with results is # [phone] (home) . If this is not the best number please call our clinic and change the number.    Medication Refills:  If you need any refills please call your pharmacy and they will contact us. Our fax number for refills is [phone]. Please allow three business for refill processing. If you need to  your refill at a new pharmacy, please contact the new pharmacy directly. The new pharmacy will help you get your medications transferred.     Scheduling:  If you have any concerns about today's visit or wish to schedule another appointment please call our office during normal business hours [phone] (8-5:00 M-F)    Contact Us:  Please call [phone] during business hours (8-5:00 M-F).  If after clinic hours, or on the weekend, please call  [phone].    Financial Assistance [phone]  [facility] Billing [phone]  Central Billing Office, [facility]: [phone]  [facility]illing [phone]  Medical Records [phone]      MENTAL HEALTH CRISIS NUMBERS:  For a medical emergency please call  911 or go to the nearest ER.     [facility]:   [facility] -[phone]   Crisis Residence [facility]idence -[phone]   Walk-In Counseling [facility] -[phone]   COPE 24/7 [place] Mobile Team -[phone] (adults)/[phone] (child)  CHILD: [facility] needs assessment team - [phone]      [facility]:   [facility] - [phone]   Walk-in counseling [facility] - [phone]   Walk-in counseling [facility] - [phone]   Crisis Residence [facility] - [phone]  Urgent  [facility] Adult Mental [place]-[phone] mobile unit/ 24/7 crisis line    National Crisis Numbers:   National Suicide Prevention Lifeline: 5-845-211-TALK (228-074-9542)  Poison Control Center - 6-181-478-8347  [employer]/resources for a list of additional resources (SOS)  Trans Lifeline a hotline for transgender people 4-249-055-0508  [employer] a hotline for LGBT youth 1-352.210.6450  Crisis Text Line: For any crisis 24/7   To: 880041  see www.crisistextline.org  - IF MAKING A CALL FEELS TOO HARD, send a text!         Again thank you for choosing PSYCHIATRY CLINIC and please let us know how we can best partner with you to improve you and your family's health.    You may be receiving a survey regarding this appointment. We would love to have your feedback, both positive and negative. The survey is done by an external company, so your answers are anonymous.

## 2020-06-10 NOTE — TELEPHONE ENCOUNTER
On 6/10/2020, at 8:11, writer called patient at [phone] to confirm Virtual Visit. Writer unable to make contact with patient. Writer left detailed voice message for call back. [phone] left as call back number. Dana Batista, [facility]

## 2020-06-10 NOTE — PROGRESS NOTES
"  Psychiatry Clinic Progress Note                                                                   Shar Atkinson is a 24 year old male who returns to the clinic for follow-up care  Therapist:  None  PCP: Behrend, Robert D  Other Providers: None    Pertinent Background:  See previous notes.  Psych critical item history includes suicidal ideation.     Interim History                                                                                                        4, 4     The patient is a good historian, reports good treatment adherence and was last seen 4/7/20.  Since the last visit,  Shar reports he is \"happy.\"  Periodic irritability.  He and his boyfriend just returned from 2 week trip to [place].  Sleep is \"pretty good.\" He will be taking classes at [employer] during summer months.     4/7/20: Shar reports Covid 19 associated lifestyle changes have impacted mood but increase in Zoloft has been helpful.  No concerns with mood dysregulation and irritability.  Sleep also not a concern.    3/10/20: Shar reports he did struggle with depression during month of February.  He reports his mood typically lowers during winter months but duration this season seems longer.  Feels a like a failure due to getting D in class last semester which has led to delaying graduations.  Endorses anhedonia, psychomotor slowing, lack of motivation, and decreased sleep.  Has been using melatonin 5mg and finds helpful.  Lamictal continues to be helpful in management of irritability and mood fluctuations.  Advised to see therapist.  Gave contact information to [facility]    11/12/19: Shar was able to have his dog as a support animal which has been extremely helpful.  Overall, \"life is pretty good.\"  He feels that both stress and his ability to manage stress has improved.  Decreased family turmoil.  Both older sister and father have obtained sobriety.  Continues to endorse academic success but will miss class " "occasionally.  He moved in with his boyfriend and a roommate.  Considering moving to [place] after graduation.  Will use Propranolol primarily for exams.      8/13/19: Shar reports his summer has been \"busy.\"  States he is \"pretty good overall\" and is reacting to events appropriately.  He is facing increased stress including family stress, having to move, and preparing next school year.  Sleeps through the night and no issue falling asleep.  Continues to endorse that Lamictal is managing mood fluctuations and impulsivity.  Shar will investigate process for obtaining emotional support animal and relay back to clinic    5/14/19: Shar reports that he is \"doing good overall.\"  Stressed due to being in midst of finals week which he attributes to worsening mood.  No concerns with academics. 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 He believes the Lamictal has been \"evening out his mood.\"  No concerns with impulsivity including sexual promiscuity and excessive spending.  No concerns with sleep.  He is interviewing for summer internship currently.  Continues to take Concerta regularly and finds helpful with management of attention deficits.      2/26/19: Shar reports since starting Lamictal that he feels that mood has leveled out.  He reports irritability and mood elevation has decreased.    He is feeling less of the \"highs.\"  States impulsive spending has ended but does continue to experience some promiscuity.  Overall, he believes that the medication has been helpful in managing mood.  Shar has experienced significant stressor including losing friend, discovering partner was cheating, familial stress, and breaking hand.  Also reports experiencing low mood several days since last appointment. 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  He reports increased mood fluctuations since returning to " "school and increased impulsive behavior (promiscuity and spending money).   He also reports increased irritability almost daily and worsening of depression symptoms.  Shar also reports typically experiencing worsening of mood when winter comes.  Reports needing to sleep each night but struggles to fall asleep due to racing thoughts.  He also reports increased anxiety especially worrying about classes and studies.  Continues to take Concerta and finds helpful.  Does not attribute worsening anxiety to Concerta.  Shar also reports today that he has had thyroid issues in past.      11/8/18: Shar reports that things are \"amazing...really, really good.\"  Currently in midst of midterms and managing stress effectively.  Depression is well managed by Zoloft.  No concerns with weight loss, increased anxiety, or sleep.  Even though Propranolol helpful, will hold due to history of asthma.  Shar will make appointment and W will attempt to contact PCP    6/5/18: Shar reports he successfully completed his sophomore year.  He plans on staying in [place] for summer.  Stopped therapy due to thinking he was not staying in [place] for summer.  He does not plan to continue as he is doing much better in regards to mental health. He does report that he has been sleeping more since school ended and due to not having a schedule.  Shar reports the Sertraline 100mg daily continues to be helpful in managing mood and anxiety.        Recent Symptoms:   Depression:  occasional irritability  Elevated:  none  Psychosis:  none  Anxiety:  periodic worry  Panic Attack:  none  Trauma Related:  none     Recent Substance Use:  no changes reported        Social/ Family History                                  [per patient report]                                 1ea,1ea   FINANCIAL SUPPORT- Full-time student at the [employer]       FEELS SAFE AT HOME- Yes    Medical / Surgical History                                                                           "                                      There is no problem list on file for this patient.      No past surgical history on file.     Medical Review of Systems                                                                                                    2,10   A comprehensive review of systems was performed and is negative other than noted in the HPI.  SVT (hx).  Asthma.  Allergy                                Nuts; Peas; and Seafood  Current Medications                                                                                                       Current Outpatient Medications   Medication Sig Dispense Refill     albuterol (PROAIR HFA/PROVENTIL HFA/VENTOLIN HFA) 108 (90 BASE) MCG/ACT Inhaler   0     lamoTRIgine (LAMICTAL) 100 MG tablet Take 1 tablet (100 mg) by mouth daily For refills, please schedule a follow-up appointment at [phone]. 30 tablet 0     methylphenidate (CONCERTA) 36 MG CR tablet Take 1 tablet (36 mg) by mouth every morning 30 tablet 0     methylphenidate (CONCERTA) 36 MG CR tablet Take 1 tablet (36 mg) by mouth every morning 30 tablet 0     methylphenidate (CONCERTA) 36 MG CR tablet Take 1 tablet (36 mg) by mouth every morning 30 tablet 0     methylphenidate HCl ER (CONCERTA) 36 MG CR tablet Take 1 tablet (36 mg) by mouth every morning 30 tablet 0     propranolol (INDERAL) 20 MG tablet Take 1 tablet (20 mg) by mouth 2 times daily As needed for anxiety 60 tablet 1     sertraline (ZOLOFT) 100 MG tablet Take 1.5 tablets (150 mg) by mouth daily For  refills, please schedule a follow-up appointment at [phone] 45 tablet 0     Vitals                                                                                                                       3, 3   There were no vitals taken for this visit.   Mental Status Exam                                                                                    9, 14 cog gs     Alertness: alert  and oriented  Appearance: adequately  "groomed  Behavior/Demeanor: cooperative, pleasant and calm, with good  eye contact   Speech: normal  Language: no problems  Psychomotor: normal or unremarkable  Mood: \"happy\"  Affect: appropriate; was congruent to mood; was congruent to content  Thought Process/Associations: unremarkable  Thought Content:  Reports none;  Denies suicidal ideation, violent ideation, delusions and paranoid ideation  Perception:  Reports none;  Denies auditory hallucinations and visual hallucinations  Insight: good  Judgment: good  Cognition: (6) does  appear grossly intact; formal cognitive testing was not done  Gait/Station and/or Muscle Strength/Tone: unable to assess    Labs and Data                                                                                                                 Rating Scales:    PHQ9    PHQ9 Today:  Not completed  PHQ-9 SCORE 6/7/2019 8/13/2019 11/12/2019   PHQ-9 Total Score 8 9 8         Diagnosis and Assessment                                                                             m2, h3     Today the following issues were addressed:    1) Major Depressive Disorder, recurrent, moderate  2) Generalized Anxiety Disorder  3) ADHD (Hx)     [place] Prescription Monitoring Program [] was checked today:  indicates methylphenidate 30mg filled regularly .     PSYCHOTROPIC DRUG INTERACTIONS: Methylphenidate-Sertraline: Concurrent use of METHYLPHENIDATE and SELECTIVE SEROTONIN REUPTAKE INHIBITORS may result in increased selective serotonin reuptake inhibitor plasma concentrations.  Propranolol-Sertraline: Concurrent use of PROPRANOLOL and SERTRALINE may result in an increased risk of chest pain. .    Plan                                                                                                                    m2, h3      1) Management of mood and anxiety  Therapy- Continue  Medications-   Continue Sertraline to 150mg daily  Continue Lamictal to 100mg daily  Continue Propranolol 20mg TID PRN for " "performance anxiety and periodic anxiety due to asthma diagnosis.  Asthma well controlled.     2) Attention management  Medications-   Increase Concerta to 54mg daily     RTC: 1 month  CRISIS NUMBERS:   Provided routinely in AVS.    Treatment Risk Statement:  The patient understands the risks, benefits, adverse effects and alternatives. Agrees to treatment with the capacity to do so. No medical contraindications to treatment. Agrees to call clinic for any problems. The patient understands to call 911 or go to the nearest ED if life threatening or urgent symptoms occur.     PROVIDER:  JOMAR Palm [facility]    VIDEO VISIT  Shar Atkinson is a 24 year old patient who is being evaluated via a billable video visit.      The patient has been notified of following:   \"This video visit will be conducted via a call between you and your physician/provider. We have found that certain health care needs can be provided without the need for an in-person physical exam. This service lets us provide the care you need with a video conversation. If a prescription is necessary we can send it directly to your pharmacy. If lab work is needed we can place an order for that and you can then stop by our lab to have the test done at a later time. Insurers are generally covering virtual visits as they would in-office visits so billing should not be different than normal.  If for some reason you do get billed incorrectly, you should contact the billing office to correct it and that number is in the AVS .    Patient has given verbal consent for video visit?:  Yes     How would you like to obtain your AVS?:  Gatheredtable    Video invitation for patient:  DOXY provider, invite not needed      AVS SmartPhrase [PsychAVS] has been placed in 'Patient Instructions':  Yes     Video- Visit Details  Type of service:  video visit for medication management  Time of service:    Date:  06/10/2020    Video Start Time:  9:00am        Video End Time:  " 9:10am    Reason for video visit:  Patient unable to travel due to Covid-19  Originating Site (patient location):  [facility]   Location- Patient's home  Distant Site (provider location):  Remote location  Mode of Communication:  Video Conference via [email or web address]  Consent:  Patient has given verbal consent for video visit?: Yes

## 2020-07-04 DIAGNOSIS — F39 MOOD DISORDER (H): ICD-10-CM

## 2020-07-04 DIAGNOSIS — F33.1 MAJOR DEPRESSIVE DISORDER, RECURRENT EPISODE, MODERATE (H): ICD-10-CM

## 2020-07-07 RX ORDER — LAMOTRIGINE 100 MG/1
TABLET ORAL
Qty: 30 TABLET | Refills: 0 | Status: SHIPPED | OUTPATIENT
Start: 2020-07-07 | End: 2020-07-16

## 2020-07-07 RX ORDER — SERTRALINE HYDROCHLORIDE 100 MG/1
TABLET, FILM COATED ORAL
Qty: 45 TABLET | Refills: 0 | Status: SHIPPED | OUTPATIENT
Start: 2020-07-07 | End: 2020-07-16

## 2020-07-07 NOTE — TELEPHONE ENCOUNTER
sertraline (ZOLOFT) 100 MG  Last refilled: 6/10/20  Qty: 45    lamoTRIgine (LAMICTAL) 100 MG  Last refilled:6/10/20  Qty: 30    Last seen 6/10/20  RTC:1  MOS  Cancel: 0  No-show: 0  Next appt: NONE  Refill decision: 30 day kim refill sent to the pharmacy - including instructions for patient to call the clinic and schedule an appointment.  Scheduling has been notified to contact the pt for appointment.

## 2020-07-13 DIAGNOSIS — F90.0 ATTENTION DEFICIT HYPERACTIVITY DISORDER, INATTENTIVE TYPE: ICD-10-CM

## 2020-07-13 RX ORDER — METHYLPHENIDATE HYDROCHLORIDE 54 MG/1
54 TABLET ORAL EVERY MORNING
Qty: 30 TABLET | Refills: 0 | Status: SHIPPED | OUTPATIENT
Start: 2020-07-13 | End: 2020-07-16

## 2020-07-13 NOTE — TELEPHONE ENCOUNTER
Last seen: 6/10/20  RTC: 1 month  Cancel: none  No-show: none  Next appt: 7/16/20     Medication requested: methylphenidate (CONCERTA) 54 MG CR tablet   Directions:Take 1 tablet (54 mg) by mouth every morning  Qty: 30  Last refilled: 6/10/20,  5/8/20 (36 mg), and 4/7/20 (36 mg)     Medication refill approved per refill protocol    30 d/s pended and routed to provider for approval

## 2020-07-13 NOTE — TELEPHONE ENCOUNTER
[facility] Call Center    Phone Message    May a detailed message be left on voicemail: yes     Reason for Call: Medication Refill Request    Has the patient contacted the pharmacy for the refill? Yes   Name of medication being requested: Methylphenidate 54mg  Provider who prescribed the medication: Diego Parikh  Pharmacy: [facility] [id number] [place] [facility] [address]   Date medication is needed: ASAP. Patient was out of medication on 7/11/20.          Action Taken: Message routed to:  Other: P [facility]    Travel Screening: Not Applicable

## 2020-07-16 ENCOUNTER — VIRTUAL VISIT (OUTPATIENT)
Dept: PSYCHIATRY | Facility: CLINIC | Age: 25
End: 2020-07-16
Attending: NURSE PRACTITIONER
Payer: COMMERCIAL

## 2020-07-16 DIAGNOSIS — F33.1 MAJOR DEPRESSIVE DISORDER, RECURRENT EPISODE, MODERATE (H): ICD-10-CM

## 2020-07-16 DIAGNOSIS — F90.0 ATTENTION DEFICIT HYPERACTIVITY DISORDER, INATTENTIVE TYPE: ICD-10-CM

## 2020-07-16 DIAGNOSIS — F39 MOOD DISORDER (H): ICD-10-CM

## 2020-07-16 RX ORDER — LAMOTRIGINE 100 MG/1
100 TABLET ORAL DAILY
Qty: 30 TABLET | Refills: 2 | Status: SHIPPED | OUTPATIENT
Start: 2020-07-16 | End: 2020-09-09

## 2020-07-16 RX ORDER — METHYLPHENIDATE HYDROCHLORIDE 54 MG/1
54 TABLET ORAL EVERY MORNING
Qty: 30 TABLET | Refills: 0 | Status: SHIPPED | OUTPATIENT
Start: 2020-08-14 | End: 2021-01-14

## 2020-07-16 RX ORDER — SERTRALINE HYDROCHLORIDE 100 MG/1
TABLET, FILM COATED ORAL
Qty: 45 TABLET | Refills: 2 | Status: SHIPPED | OUTPATIENT
Start: 2020-07-16 | End: 2020-09-09

## 2020-07-16 RX ORDER — METHYLPHENIDATE HYDROCHLORIDE 54 MG/1
54 TABLET ORAL EVERY MORNING
Qty: 30 TABLET | Refills: 0 | Status: SHIPPED | OUTPATIENT
Start: 2020-07-16 | End: 2020-11-16

## 2020-07-16 RX ORDER — METHYLPHENIDATE HYDROCHLORIDE 54 MG/1
54 TABLET ORAL EVERY MORNING
Qty: 30 TABLET | Refills: 0 | Status: SHIPPED | OUTPATIENT
Start: 2020-09-12 | End: 2021-01-14

## 2020-07-16 ASSESSMENT — PAIN SCALES - GENERAL: PAINLEVEL: NO PAIN (0)

## 2020-07-16 NOTE — PROGRESS NOTES
"Video- Visit Details  Type of service:  video visit for medication management  Time of service:    Date:  07/16/2020    Video Start Time:  9:33 AM        Video End Time:  9:48am    Reason for video visit:  Patient unable to travel due to Covid-19  Originating Site (patient location):  [facility]   Location- Patient's home  Distant Site (provider location):  Remote location  Mode of Communication:  Video Conference via [email or web address][employer]  Consent:  Patient has given verbal consent for video visit?: Yes     VIDEO VISIT  Shar Atkinson is a 25 year old patient who is being evaluated via a billable video visit.      The patient has been notified of following:   \"This video visit will be conducted via a call between you and your physician/provider. We have found that certain health care needs can be provided without the need for an in-person physical exam. This service lets us provide the care you need with a video conversation. If a prescription is necessary we can send it directly to your pharmacy. If lab work is needed we can place an order for that and you can then stop by our lab to have the test done at a later time. Insurers are generally covering virtual visits as they would in-office visits so billing should not be different than normal.  If for some reason you do get billed incorrectly, you should contact the billing office to correct it and that number is in the AVS .    Patient has given verbal consent for video visit?:  Yes    How would you like to obtain your AVS?:  email    Patient would prefer that any video invitations be sent by: Send to e-mail at: [email or web address]      AVS SmartPhrase [PsychAVS] has been placed in 'Patient Instructions':  Yes       Psychiatry Clinic Progress Note                                                                   Shar Atkinson is a 25 year old male who returns to the clinic for follow-up care  Therapist:  None  PCP: Behrend, Robert D  Other Providers: None    Pertinent Background:  " "See previous notes.  Psych critical item history includes suicidal ideation.     Interim History                                                                                                        4, 4     The patient is a good historian, reports good treatment adherence and was last seen 6/10/20.  Since the last visit,  Shar reports he tolerated the increase in Concerta and finds beneficial.   No concerns with side effects.  Taking summer classes at [employer].   Also working at [employer] for the summer. Sleep good.  Depression and anxiety stable.       6/10/20: Shar reports he is \"happy.\"  Periodic irritability.  He and his boyfriend just returned from 2 week trip to [place].  Sleep is \"pretty good.\" He will be taking classes at [employer] during summer months.     4/7/20: Shar reports Covid 19 associated lifestyle changes have impacted mood but increase in Zoloft has been helpful.  No concerns with mood dysregulation and irritability.  Sleep also not a concern.    3/10/20: Shar reports he did struggle with depression during month of February.  He reports his mood typically lowers during winter months but duration this season seems longer.  Feels a like a failure due to getting D in class last semester which has led to delaying graduations.  Endorses anhedonia, psychomotor slowing, lack of motivation, and decreased sleep.  Has been using melatonin 5mg and finds helpful.  Lamictal continues to be helpful in management of irritability and mood fluctuations.  Advised to see therapist.  Gave contact information to [facility]    11/12/19: Shar was able to have his dog as a support animal which has been extremely helpful.  Overall, \"life is pretty good.\"  He feels that both stress and his ability to manage stress has improved.  Decreased family turmoil.  Both older sister and father have obtained sobriety.  Continues to endorse academic success but will miss class occasionally.  He moved in with " "his boyfriend and a roommate.  Considering moving to [place] after graduation.  Will use Propranolol primarily for exams.      8/13/19: Shar reports his summer has been \"busy.\"  States he is \"pretty good overall\" and is reacting to events appropriately.  He is facing increased stress including family stress, having to move, and preparing next school year.  Sleeps through the night and no issue falling asleep.  Continues to endorse that Lamictal is managing mood fluctuations and impulsivity.  Shar will investigate process for obtaining emotional support animal and relay back to clinic    5/14/19: Shar reports that he is \"doing good overall.\"  Stressed due to being in midst of finals week which he attributes to worsening mood.  No concerns with academics. 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 He believes the Lamictal has been \"evening out his mood.\"  No concerns with impulsivity including sexual promiscuity and excessive spending.  No concerns with sleep.  He is interviewing for summer internship currently.  Continues to take Concerta regularly and finds helpful with management of attention deficits.      2/26/19: Shar reports since starting Lamictal that he feels that mood has leveled out.  He reports irritability and mood elevation has decreased.    He is feeling less of the \"highs.\"  States impulsive spending has ended but does continue to experience some promiscuity.  Overall, he believes that the medication has been helpful in managing mood.  Shar has experienced significant stressor including losing friend, discovering partner was cheating, familial stress, and breaking hand.  Also reports experiencing low mood several days since last appointment. 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  He reports increased mood fluctuations since returning to school and increased impulsive " "behavior (promiscuity and spending money).   He also reports increased irritability almost daily and worsening of depression symptoms.  Shar also reports typically experiencing worsening of mood when winter comes.  Reports needing to sleep each night but struggles to fall asleep due to racing thoughts.  He also reports increased anxiety especially worrying about classes and studies.  Continues to take Concerta and finds helpful.  Does not attribute worsening anxiety to Concerta.  Shar also reports today that he has had thyroid issues in past.      11/8/18: Shar reports that things are \"amazing...really, really good.\"  Currently in midst of midterms and managing stress effectively.  Depression is well managed by Zoloft.  No concerns with weight loss, increased anxiety, or sleep.  Even though Propranolol helpful, will hold due to history of asthma.  Shar will make appointment and W will attempt to contact PCP    6/5/18: Shar reports he successfully completed his sophomore year.  He plans on staying in [place] for summer.  Stopped therapy due to thinking he was not staying in [place] for summer.  He does not plan to continue as he is doing much better in regards to mental health. He does report that he has been sleeping more since school ended and due to not having a schedule.  Shar reports the Sertraline 100mg daily continues to be helpful in managing mood and anxiety.        Recent Symptoms:   Depression:  occasional irritability  Elevated:  none  Psychosis:  none  Anxiety:  periodic worry  Panic Attack:  none  Trauma Related:  none     Recent Substance Use:  no changes reported        Social/ Family History                                  [per patient report]                                 1ea,1ea   FINANCIAL SUPPORT- Full-time student at the [employer]       FEELS SAFE AT HOME- Yes    Medical / Surgical History                                                                                                          " "      There is no problem list on file for this patient.      No past surgical history on file.     Medical Review of Systems                                                                                                    2,10   A comprehensive review of systems was performed and is negative other than noted in the HPI.  SVT (hx).  Asthma.  Allergy                                Nuts; Peas; and Seafood  Current Medications                                                                                                       Current Outpatient Medications   Medication Sig Dispense Refill     albuterol (PROAIR HFA/PROVENTIL HFA/VENTOLIN HFA) 108 (90 BASE) MCG/ACT Inhaler   0     lamoTRIgine (LAMICTAL) 100 MG tablet TAKE 1 TABLET BY MOUTH EVERY DAY 30 tablet 0     methylphenidate (CONCERTA) 54 MG CR tablet Take 1 tablet (54 mg) by mouth every morning 30 tablet 0     propranolol (INDERAL) 20 MG tablet Take 1 tablet (20 mg) by mouth 2 times daily As needed for anxiety 60 tablet 1     sertraline (ZOLOFT) 100 MG tablet TAKE 1 AND 1/2 TABLETS BY MOUTH ONCE DAILY 45 tablet 0     Vitals                                                                                                                       3, 3   There were no vitals taken for this visit.   Mental Status Exam                                                                                    9, 14 cog gs     Alertness: alert  and oriented  Appearance: adequately groomed  Behavior/Demeanor: cooperative, pleasant and calm, with good  eye contact   Speech: regular rate and rhythm  Language: good  Psychomotor: normal or unremarkable  Mood: \"good\"  Affect: appropriate; was congruent to mood; was congruent to content  Thought Process/Associations: unremarkable  Thought Content:  Reports none;  Denies suicidal ideation, violent ideation, delusions and paranoid ideation  Perception:  Reports none;  Denies auditory hallucinations and visual hallucinations  Insight: " good  Judgment: good  Cognition: (6) does  appear grossly intact; formal cognitive testing was not done  Gait/Station and/or Muscle Strength/Tone: unable to assess    Labs and Data                                                                                                                 Rating Scales:    PHQ9    PHQ9 Today:  Not completed  PHQ-9 SCORE 6/7/2019 8/13/2019 11/12/2019   PHQ-9 Total Score 8 9 8         Diagnosis and Assessment                                                                             m2, h3     Today the following issues were addressed:    1) Major Depressive Disorder, recurrent, moderate  2) Generalized Anxiety Disorder  3) ADHD (Hx)     [place] Prescription Monitoring Program [] was checked today:  indicates methylphenidate 30mg filled regularly .     PSYCHOTROPIC DRUG INTERACTIONS: Methylphenidate-Sertraline: Concurrent use of METHYLPHENIDATE and SELECTIVE SEROTONIN REUPTAKE INHIBITORS may result in increased selective serotonin reuptake inhibitor plasma concentrations.  Propranolol-Sertraline: Concurrent use of PROPRANOLOL and SERTRALINE may result in an increased risk of chest pain. .    Plan                                                                                                                    m2, h3      1) Management of mood and anxiety  Therapy- Continue  Medications-   Continue Sertraline to 150mg daily  Continue Lamictal to 100mg daily  Continue Propranolol 20mg TID PRN for performance anxiety and periodic anxiety due to asthma diagnosis.  Asthma well controlled.     2) Attention management  Medications-   Continue Concerta 54mg daily     RTC: 3 months  CRISIS NUMBERS:   Provided routinely in AVS.    Treatment Risk Statement:  The patient understands the risks, benefits, adverse effects and alternatives. Agrees to treatment with the capacity to do so. No medical contraindications to treatment. Agrees to call clinic for any problems. The patient understands to  call 911 or go to the nearest ED if life threatening or urgent symptoms occur.     PROVIDER:  JOMAR Palm CNP

## 2020-07-16 NOTE — PATIENT INSTRUCTIONS
Thank you for coming to the PSYCHIATRY CLINIC.    Lab Testing:  If you had lab testing today and your results are reassuring or normal they will be mailed to you or sent through [employer] within 7 days. If the lab tests need quick action we will call you with the results. The phone number we will call with results is # [phone] (home) . If this is not the best number please call our clinic and change the number.    Medication Refills:  If you need any refills please call your pharmacy and they will contact us. Our fax number for refills is [phone]. Please allow three business for refill processing. If you need to  your refill at a new pharmacy, please contact the new pharmacy directly. The new pharmacy will help you get your medications transferred.     Scheduling:  If you have any concerns about today's visit or wish to schedule another appointment please call our office during normal business hours [phone] (8-5:00 M-F)    Contact Us:  Please call [phone] during business hours (8-5:00 M-F).  If after clinic hours, or on the weekend, please call  [phone].    Financial Assistance [phone]  [facility] Billing [phone]  Central Billing Office, [facility]: [phone]  [place] Billing [phone]  Medical Records [phone]      MENTAL HEALTH CRISIS NUMBERS:  For a medical emergency please call  911 or go to the nearest ER.     [facility]:   [facility] -[phone]   Crisis Residence [facility] -[phone]   Walk-In Counseling [facility] -[phone]   COPE 24/7 [place] Mobile Team -[phone] (adults)/[phone] (child)  CHILD: [facility] needs assessment team - [phone]      [facility]:   [facility] - [phone]   Walk-in counseling [facility] - [phone]   Walk-in counseling [facility] - [phone]   Crisis Residence [facility] - [phone]  Urgent  [facility] Adult Mental [facility]-[phone] mobile unit/ 24/7 crisis line    National Crisis Numbers:   National Suicide Prevention Lifeline: 6-103-848-TALK (695-487-1729)  Poison Control Center - 2-640-940-2508  [employer]/resources for a list of additional resources (SOS)  Trans Lifeline a hotline for transgender people 3-336-716-8096  [employer] a hotline for LGBT youth 1-300.214.9183  Crisis Text Line: For any crisis 24/7   To: 736444  see www.crisistextline.org  - IF MAKING A CALL FEELS TOO HARD, send a text!         Again thank you for choosing PSYCHIATRY CLINIC and please let us know how we can best partner with you to improve you and your family's health.    You may be receiving a survey regarding this appointment. We would love to have your feedback, both positive and negative. The survey is done by an external company, so your answers are anonymous.

## 2020-08-24 ENCOUNTER — TELEPHONE (OUTPATIENT)
Dept: PSYCHIATRY | Facility: CLINIC | Age: 25
End: 2020-08-24

## 2020-08-24 DIAGNOSIS — F39 MOOD DISORDER (H): ICD-10-CM

## 2020-08-24 NOTE — TELEPHONE ENCOUNTER
"Writer returned patient's phone call. He informed this writer that he has been on Lamictal for approximately one year (50 mg for 6 months and 100 mg for 6 months). He does not notice any improvement in his mood, but also denies any worsening of mood. The pt said his life is pretty good right now, but he's not feeling super happy. He also denies sadness. He said, \"I feel like a zombie.\" The pt no longer wants to be on Lamictal and is wondering if he can safely try tapering off the medication. Confirmed that he doesn't have any immediate safety concerns. He also mentioned that he would like to eventually taper off of Zoloft, but will give this more time.    Message forwarded to provider for guidance. Will call pt back with the provider's recommendations.  "

## 2020-08-24 NOTE — TELEPHONE ENCOUNTER
Writer called pt. No answer. LVM encouraging him to schedule a MFU to discuss med changes with the provider. Also confirmed that he can call this writer to discuss changes further.

## 2020-08-24 NOTE — TELEPHONE ENCOUNTER
"[facility] Call Center    Phone Message    May a detailed message be left on voicemail: yes     Reason for Call: Other:   Patient called with a request to \"change something\" with his medication. Patient did not state which medication. He declined scheduling an appointment at this time unless it is determined it would be necessary.      Action Taken: Message routed to:  Other: Nursing pool    Travel Screening: Not Applicable                                                                           "

## 2020-08-25 NOTE — TELEPHONE ENCOUNTER
Diego Parikh APRN CNP Labossiere, Laura, RN    Caller: Unspecified (Yesterday, 11:12 AM)               Santos Michaud.  He can take 50mg for 2 weeks then stop        Writer called pt and LVM relaying the above recommendation. Requested a c/b if he would prefer 25 mg tabs (taking 2 tabs daily).

## 2020-08-31 NOTE — TELEPHONE ENCOUNTER
Received incoming phone call from the pt. He is calling regarding ongoing concerns for his current medication regimen. The pt lowered his Lamictal dose to 50 mg about 6 days ago. He also has concerns regarding his current dose of Zoloft. The pt reports significant weight gain since starting the Zoloft. Confirmed that he was taking 100 mg daily, not 150 mg daily. The pt said his pharmacy must have had previous prescriptions for 100 mg daily, as this is what he most recently filled. Still, he would like to taper off of sertraline as well, as he's unhappy with the weight gain. Pt reports he started a taper on his own and has taken 75 mg daily for the last two days. So far, he denies any significant changes in mood. Asked that he connect with the team before making any changes in the future, which he agreed to. Pt is open to discussing other antidepressant options. He's now scheduled on 9/9 to discuss these options with the provider. Will reach out to provider in the meantime to see if appt is necessary or if he's willing to make changes without an appt.

## 2020-09-01 NOTE — TELEPHONE ENCOUNTER
Diego Parikh, APRN Keily Courtney, RN    Caller: Unspecified (1 week ago)               Santos Michaud,     Yecristina he should taper one at a time.  Finish lamictal first.  Also he should hold off Zoloft taper until I see him again     Thanks        Called pt and LVM with the above message. Requested a c/b if he has any further questions.

## 2020-09-09 ENCOUNTER — VIRTUAL VISIT (OUTPATIENT)
Dept: PSYCHIATRY | Facility: CLINIC | Age: 25
End: 2020-09-09
Attending: NURSE PRACTITIONER
Payer: COMMERCIAL

## 2020-09-09 DIAGNOSIS — F33.1 MAJOR DEPRESSIVE DISORDER, RECURRENT EPISODE, MODERATE (H): ICD-10-CM

## 2020-09-09 ASSESSMENT — PAIN SCALES - GENERAL: PAINLEVEL: NO PAIN (0)

## 2020-09-09 NOTE — PROGRESS NOTES
"Video- Visit Details  Type of service:  video visit for medication management  Time of service:    Date:  09/09/2020    Video Start Time:  8:35 AM        Video End Time:  8:51am    Reason for video visit:  Patient unable to travel due to Covid-19  Originating Site (patient location):  [facility]   Location- Patient's home  Distant Site (provider location):  Remote location  Mode of Communication:  Video Conference via [email or web address]  Consent:  Patient has given verbal consent for video visit?: Yes     VIDEO VISIT  Shar Atkinson is a 25 year old patient who is being evaluated via a billable video visit.      The patient has been notified of following:   \"This video visit will be conducted via a call between you and your physician/provider. We have found that certain health care needs can be provided without the need for an in-person physical exam. This service lets us provide the care you need with a video conversation. If a prescription is necessary we can send it directly to your pharmacy. If lab work is needed we can place an order for that and you can then stop by our lab to have the test done at a later time. Insurers are generally covering virtual visits as they would in-office visits so billing should not be different than normal.  If for some reason you do get billed incorrectly, you should contact the billing office to correct it and that number is in the AVS .    Video Conference to be completed via:  [email or web address]    Patient has given verbal consent for video visit?:  Yes    Patient would prefer that any video invitations be sent by: Send to e-mail at: [email or web address]      How would patient like to obtain AVS?:  Mail a copy    AVS SmartPhrase [PsychAVS] has been placed in 'Patient Instructions':  Yes      Psychiatry Clinic Progress Note                                                                   Shar Atkinson is a 25 year old male who returns to the clinic for follow-up care  Therapist:  None  PCP: Behrend, " "Dieter GEORGE  Other Providers: None    Pertinent Background:  See previous notes.  Psych critical item history includes suicidal ideation.     Interim History                                                                                                        4, 4     The patient is a good historian, reports good treatment adherence and was last seen 7/16/20.  Since the last visit,  Shar is requesting to taper off his psychiatric medications.  He states his \"life is pretty good right now.\"  He also shares that relationships with family.  Mood and anxiety are stable.  Currently taking Lamictal 50mg and not changes to mood.  Will discontinue today.  He also decreased Zoloft to 50mg approximately a week ago.  Will meet again in a month to discuss possible discontinuation of Zoloft    7/16/20: Shar reports he tolerated the increase in Concerta and finds beneficial.   No concerns with side effects.  Taking summer classes at [employer].   Also working at [employer] for the summer. Sleep good.  Depression and anxiety stable.       6/10/20: Shar reports he is \"happy.\"  Periodic irritability.  He and his boyfriend just returned from 2 week trip to [place].  Sleep is \"pretty good.\" He will be taking classes at [employer] during summer months.     4/7/20: Shar reports Covid 19 associated lifestyle changes have impacted mood but increase in Zoloft has been helpful.  No concerns with mood dysregulation and irritability.  Sleep also not a concern.    3/10/20: Shar reports he did struggle with depression during month of February.  He reports his mood typically lowers during winter months but duration this season seems longer.  Feels a like a failure due to getting D in class last semester which has led to delaying graduations.  Endorses anhedonia, psychomotor slowing, lack of motivation, and decreased sleep.  Has been using melatonin 5mg and finds helpful.  Lamictal continues to be helpful in management of irritability and mood fluctuations. " " Advised to see therapist.  Gave contact information to [facility]    11/12/19: Shar was able to have his dog as a support animal which has been extremely helpful.  Overall, \"life is pretty good.\"  He feels that both stress and his ability to manage stress has improved.  Decreased family turmoil.  Both older sister and father have obtained sobriety.  Continues to endorse academic success but will miss class occasionally.  He moved in with his boyfriend and a roommate.  Considering moving to [place] after graduation.  Will use Propranolol primarily for exams.      8/13/19: Shar reports his summer has been \"busy.\"  States he is \"pretty good overall\" and is reacting to events appropriately.  He is facing increased stress including family stress, having to move, and preparing next school year.  Sleeps through the night and no issue falling asleep.  Continues to endorse that Lamictal is managing mood fluctuations and impulsivity.  Shar will investigate process for obtaining emotional support animal and relay back to clinic    5/14/19: Shar reports that he is \"doing good overall.\"  Stressed due to being in midst of finals week which he attributes to worsening mood.  No concerns with academics. 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 He believes the Lamictal has been \"evening out his mood.\"  No concerns with impulsivity including sexual promiscuity and excessive spending.  No concerns with sleep.  He is interviewing for summer internship currently.  Continues to take Concerta regularly and finds helpful with management of attention deficits.      2/26/19: Shar reports since starting Lamictal that he feels that mood has leveled out.  He reports irritability and mood elevation has decreased.    He is feeling less of the \"highs.\"  States impulsive spending has ended but does continue to experience some promiscuity.  Overall, he believes that the " "medication has been helpful in managing mood.  Shar has experienced significant stressor including losing friend, discovering partner was cheating, familial stress, and breaking hand.  Also reports experiencing low mood several days since last appointment. 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  He reports increased mood fluctuations since returning to school and increased impulsive behavior (promiscuity and spending money).   He also reports increased irritability almost daily and worsening of depression symptoms.  Shar also reports typically experiencing worsening of mood when winter comes.  Reports needing to sleep each night but struggles to fall asleep due to racing thoughts.  He also reports increased anxiety especially worrying about classes and studies.  Continues to take Concerta and finds helpful.  Does not attribute worsening anxiety to Concerta.  Shar also reports today that he has had thyroid issues in past.      11/8/18: Shar reports that things are \"amazing...really, really good.\"  Currently in midst of midterms and managing stress effectively.  Depression is well managed by Zoloft.  No concerns with weight loss, increased anxiety, or sleep.  Even though Propranolol helpful, will hold due to history of asthma.  Shar will make appointment and W will attempt to contact PCP    6/5/18: Shar reports he successfully completed his sophomore year.  He plans on staying in [place] for summer.  Stopped therapy due to thinking he was not staying in [place] for summer.  He does not plan to continue as he is doing much better in regards to mental health. He does report that he has been sleeping more since school ended and due to not having a schedule.  Shar reports the Sertraline 100mg daily continues to be helpful in managing mood and anxiety.        Recent Symptoms:   Depression:  occasional irritability  Elevated:  " none  Psychosis:  none  Anxiety:  periodic worry  Panic Attack:  none  Trauma Related:  none     Recent Substance Use:  no changes reported        Social/ Family History                                  [per patient report]                                 1ea,1ea   FINANCIAL SUPPORT- Full-time student at the [employer]       FEELS SAFE AT HOME- Yes    Medical / Surgical History                                                                                                                There is no problem list on file for this patient.      No past surgical history on file.     Medical Review of Systems                                                                                                    2,10   A comprehensive review of systems was performed and is negative other than noted in the HPI.  SVT (hx).  Asthma.  Allergy                                Nuts; Peas; and Seafood  Current Medications                                                                                                       Current Outpatient Medications   Medication Sig Dispense Refill     albuterol (PROAIR HFA/PROVENTIL HFA/VENTOLIN HFA) 108 (90 BASE) MCG/ACT Inhaler   0     lamoTRIgine (LAMICTAL) 100 MG tablet Take 1 tablet (100 mg) by mouth daily 30 tablet 2     methylphenidate (CONCERTA) 54 MG CR tablet Take 1 tablet (54 mg) by mouth every morning 30 tablet 0     methylphenidate (CONCERTA) 54 MG CR tablet Take 1 tablet (54 mg) by mouth every morning 30 tablet 0     [START ON 9/12/2020] methylphenidate (CONCERTA) 54 MG CR tablet Take 1 tablet (54 mg) by mouth every morning 30 tablet 0     propranolol (INDERAL) 20 MG tablet Take 1 tablet (20 mg) by mouth 2 times daily As needed for anxiety 60 tablet 1     sertraline (ZOLOFT) 100 MG tablet TAKE 1 AND 1/2 TABLETS BY MOUTH ONCE DAILY 45 tablet 2     Vitals                                                                                                                       3, 3   There were no  vitals taken for this visit.   Mental Status Exam                                                                                    9, 14 cog gs     Alertness: alert  and oriented  Appearance: adequately groomed  Behavior/Demeanor: cooperative, pleasant and calm, with good  eye contact   Speech: normal  Language: good  Psychomotor: normal or unremarkable  Mood: description consistent with euthymia  Affect: appropriate; was congruent to mood; was congruent to content  Thought Process/Associations: unremarkable  Thought Content:  Reports none;  Denies suicidal ideation, violent ideation, delusions and paranoid ideation  Perception:  Reports none;  Denies auditory hallucinations and visual hallucinations  Insight: good  Judgment: good  Cognition: (6) does  appear grossly intact; formal cognitive testing was not done  Gait/Station and/or Muscle Strength/Tone: unable to assess    Labs and Data                                                                                                                 Rating Scales:    PHQ9    PHQ9 Today:  Not completed  PHQ-9 SCORE 6/7/2019 8/13/2019 11/12/2019   PHQ-9 Total Score 8 9 8         Diagnosis and Assessment                                                                             m2, h3     Today the following issues were addressed:    1) Major Depressive Disorder, recurrent, moderate  2) Generalized Anxiety Disorder  3) ADHD (Hx)     [place] Prescription Monitoring Program [] was checked today:  indicates methylphenidate 30mg filled regularly .     PSYCHOTROPIC DRUG INTERACTIONS: Methylphenidate-Sertraline: Concurrent use of METHYLPHENIDATE and SELECTIVE SEROTONIN REUPTAKE INHIBITORS may result in increased selective serotonin reuptake inhibitor plasma concentrations.  Propranolol-Sertraline: Concurrent use of PROPRANOLOL and SERTRALINE may result in an increased risk of chest pain. .    Plan                                                                                                                     m2, h3      1) Management of mood and anxiety  Therapy- Continue  Medications-   Continue Sertraline 50mg daily  Discontinue Lamictal  Continue Propranolol 20mg TID PRN for performance anxiety and periodic anxiety due to asthma diagnosis.  Asthma well controlled.     2) Attention management  Medications-   Continue Concerta 54mg daily     RTC: 1 month  CRISIS NUMBERS:   Provided routinely in AVS.    Treatment Risk Statement:  The patient understands the risks, benefits, adverse effects and alternatives. Agrees to treatment with the capacity to do so. No medical contraindications to treatment. Agrees to call clinic for any problems. The patient understands to call 911 or go to the nearest ED if life threatening or urgent symptoms occur.     PROVIDER:  JOMAR Palm CNP

## 2020-09-09 NOTE — PATIENT INSTRUCTIONS
Thank you for coming to the PSYCHIATRY CLINIC.    Lab Testing:  If you had lab testing today and your results are reassuring or normal they will be mailed to you or sent through [employer] within 7 days. If the lab tests need quick action we will call you with the results. The phone number we will call with results is # [phone] (home) . If this is not the best number please call our clinic and change the number.    Medication Refills:  If you need any refills please call your pharmacy and they will contact us. Our fax number for refills is [phone]. Please allow three business for refill processing. If you need to  your refill at a new pharmacy, please contact the new pharmacy directly. The new pharmacy will help you get your medications transferred.     Scheduling:  If you have any concerns about today's visit or wish to schedule another appointment please call our office during normal business hours [phone] (8-5:00 M-F)    Contact Us:  Please call [phone] during business hours (8-5:00 M-F).  If after clinic hours, or on the weekend, please call  [phone].    Financial Assistance [phone]  [facility] Billing [phone]  Central Billing Office, [facility]: [phone]  [place] Billing [phone]  Medical Records [phone]      MENTAL HEALTH CRISIS NUMBERS:  For a medical emergency please call  911 or go to the nearest ER.     [facility]:   [facility] -[phone]   Crisis Residence [facility] -[phone]   Walk-In Counseling [facility] -[phone]   COPE 24/7 [place] Mobile Team -[phone] (adults)/[phone] (child)  CHILD: [facility] needs assessment team - [phone]      [facility]:   [facility] - [phone]   Walk-in counseling [facility] - [phone]   Walk-in counseling [facility] - [phone]   Crisis Residence [facility] - [phone]  Urgent  [facility] Adult Mental [facility]-[phone] mobile unit/ 24/7 crisis line    National Crisis Numbers:   National Suicide Prevention Lifeline: 9-915-047-TALK (450-267-2602)  Poison Control Center - 1-630-206-1840  [employer]/resources for a list of additional resources (SOS)  Trans Lifeline a hotline for transgender people 9-130-395-9197  [employer] a hotline for LGBT youth 1-952.865.9666  Crisis Text Line: For any crisis 24/7   To: 617081  see www.crisistextline.org  - IF MAKING A CALL FEELS TOO HARD, send a text!         Again thank you for choosing PSYCHIATRY CLINIC and please let us know how we can best partner with you to improve you and your family's health.    You may be receiving a survey regarding this appointment. We would love to have your feedback, both positive and negative. The survey is done by an external company, so your answers are anonymous.

## 2020-11-01 DIAGNOSIS — F39 MOOD DISORDER (H): ICD-10-CM

## 2020-11-04 RX ORDER — LAMOTRIGINE 100 MG/1
TABLET ORAL
Qty: 90 TABLET | OUTPATIENT
Start: 2020-11-04

## 2020-11-11 ENCOUNTER — TELEPHONE (OUTPATIENT)
Dept: PSYCHIATRY | Facility: CLINIC | Age: 25
End: 2020-11-11

## 2020-11-11 DIAGNOSIS — F33.1 MAJOR DEPRESSIVE DISORDER, RECURRENT EPISODE, MODERATE (H): ICD-10-CM

## 2020-11-11 NOTE — LETTER
November 12, 2020                To Whom It May Concern:    Please provide accommodation for Shar Atkinson during the coming weeks and excuse his absence during the first week of November.  He has reached out to his provider for a medical concern during the week of November 11th and is actively working towards wellness.    Sincerely,            LYSSA OrtaN, RN, CPN on behalf of Diego HADLEY, CPN

## 2020-11-12 RX ORDER — SERTRALINE HYDROCHLORIDE 100 MG/1
100 TABLET, FILM COATED ORAL DAILY
Qty: 30 TABLET | Refills: 0 | Status: SHIPPED | OUTPATIENT
Start: 2020-11-12 | End: 2020-12-02

## 2020-11-12 NOTE — TELEPHONE ENCOUNTER
"Spoke with patient on the phone. He had a \"bad week\" where he got behind in school. He missed deadlines because in a depressive state. Not comfortable sharing with professors his depression diagnosis or COVID status.  Would prefer a generic letter from provider requesting temporary accommodations for a weeks worth of schoolwork he was unable to complete d/t depression.    Letter drafted.  Pt requesting it be e-mailed to [email or web address]  "

## 2020-11-12 NOTE — TELEPHONE ENCOUNTER
LVM for patient requesting a call back regarding letter request.  Writer told patient that request to increase Zoloft to 100mg would be routed to provider as well.  Per Chart Review, pt has been on this dose in the past.

## 2020-11-13 NOTE — TELEPHONE ENCOUNTER
LVM with patient to confirm accommodation letter contents.  Requested call back to discuss letter before e-mailing to patient.

## 2020-11-16 DIAGNOSIS — F90.0 ATTENTION DEFICIT HYPERACTIVITY DISORDER, INATTENTIVE TYPE: ICD-10-CM

## 2020-11-16 RX ORDER — METHYLPHENIDATE HYDROCHLORIDE 54 MG/1
54 TABLET ORAL EVERY MORNING
Qty: 16 TABLET | Refills: 0 | Status: SHIPPED | OUTPATIENT
Start: 2020-11-16 | End: 2020-12-02

## 2020-11-16 NOTE — TELEPHONE ENCOUNTER
Last seen: 9/9  RTC: 1 month  Cancel: none  No-show: none  Next appt: 12/2     Incoming refill from patient via phone.     Medication requested: methylphenidate (CONCERTA) 54 MG CR tablet  Directions: Take 1 tablet (54 mg) by mouth every morning - Oral  Qty: 30  Last refilled: 10/16 #30, 9/13 #30, 8/10 #30    Medication pended and routed to provider for approval.

## 2020-11-16 NOTE — TELEPHONE ENCOUNTER
LVM notifying of Rx sent to pharmacy and requesting another call back to discuss accommodation letter, see encounter from 11/11.

## 2020-11-16 NOTE — TELEPHONE ENCOUNTER
[facility] Call Center    Phone Message    May a detailed message be left on voicemail: yes     Reason for Call: Medication Refill Request    Has the patient contacted the pharmacy for the refill? Yes   Name of medication being requested: methylphenidate 54mg  Provider who prescribed the medication: JOMAR Dolan CNP  Pharmacy: [facility] [id number] [place] [address]  Date medication is needed: ASAP - pt is out of medication        Action Taken: Message routed to:  Other: Nurse pool    Travel Screening: Not Applicable

## 2020-12-02 ENCOUNTER — VIRTUAL VISIT (OUTPATIENT)
Dept: PSYCHIATRY | Facility: CLINIC | Age: 25
End: 2020-12-02
Attending: NURSE PRACTITIONER
Payer: COMMERCIAL

## 2020-12-02 ENCOUNTER — TELEPHONE (OUTPATIENT)
Dept: PSYCHIATRY | Facility: CLINIC | Age: 25
End: 2020-12-02

## 2020-12-02 DIAGNOSIS — F90.0 ATTENTION DEFICIT HYPERACTIVITY DISORDER, INATTENTIVE TYPE: ICD-10-CM

## 2020-12-02 DIAGNOSIS — F33.1 MAJOR DEPRESSIVE DISORDER, RECURRENT EPISODE, MODERATE (H): ICD-10-CM

## 2020-12-02 PROCEDURE — 99213 OFFICE O/P EST LOW 20 MIN: CPT | Mod: 95 | Performed by: NURSE PRACTITIONER

## 2020-12-02 RX ORDER — METHYLPHENIDATE HYDROCHLORIDE 36 MG/1
36 TABLET ORAL EVERY MORNING
Qty: 30 TABLET | Refills: 0 | Status: SHIPPED | OUTPATIENT
Start: 2020-12-02 | End: 2021-08-12

## 2020-12-02 RX ORDER — SERTRALINE HYDROCHLORIDE 100 MG/1
100 TABLET, FILM COATED ORAL DAILY
Qty: 30 TABLET | Refills: 0 | Status: SHIPPED | OUTPATIENT
Start: 2020-12-02 | End: 2021-01-06

## 2020-12-02 NOTE — TELEPHONE ENCOUNTER
On 12/2/2020, at 1101, writer called patient at mobile to confirm Virtual Visit. Writer unable to make contact with patient. Writer left detailed voice message for callback. [phone], left as call back number. ALCIDES Bender, EMT

## 2020-12-02 NOTE — LETTER
Patient:  Shar Atkinson  :   1995  MRN:     [id number]      2020    Patient Name:  Shar Atkinson    Physician: JOMAR Palm CNP    To Whom It May Concern,    I have been treating Shar for the past 1.5 years to better manage his depression and anxiety.  To date, he was been thoroughly engaged in his treatment and has made great strides.  I was informed during our appointment on 20 that he contracted Covid19 in early November.  It should be stated that I have not seen any documents confirming this diagnosis in his [place] medical record.  However, he reports experiencing the physical symptoms associated with Covid as well as it having an impact on his mental health.  Shar reports decreased motivation, persistent low mood, and attention deficits.  These symptoms impacted his schoolwork.  If possible, Shar would greatly benefit from any appropriate extension so he can complete his assignments.    Thank you for your consideration,      Diego Parikh DNP, APRN  Psychiatric Nurse Practitioner          [phone]  1995

## 2020-12-18 ENCOUNTER — TELEPHONE (OUTPATIENT)
Dept: PSYCHIATRY | Facility: CLINIC | Age: 25
End: 2020-12-18

## 2020-12-18 NOTE — PROGRESS NOTES
"VIDEO VISIT  Shar Atkinson is a 25 year old patient who is being evaluated via a billable video visit.      The patient has been notified of following:   \"We have found that certain health care needs can be provided without the need for an in-person physical exam. This service lets us provide the care you need with a video conversation. If a prescription is necessary we can send it directly to your pharmacy. If lab work is needed we can place an order for that and you can then stop by our lab to have the test done at a later time. Insurers are generally covering virtual visits as they would in-office visits so billing should not be different than normal.  If for some reason you do get billed incorrectly, you should contact the billing office to correct it and that number is in the AVS .    Patient has given verbal consent for video visit?: Yes   How would you like to obtain your AVS?: not requested  AVS SmartPhrase [PsychAVS] has been placed in 'Patient Instructions': N/A      Video- Visit Details  Type of service:  video visit for medication management  Time of service:    Date:  12/02/2020    Video Start Time:  11:33 AM        Video End Time:  11:46am    Reason for video visit:  Patient unable to travel due to Covid-19  Originating Site (patient location):  [facility]   Location- Patient's home  Distant Site (provider location):  Remote location  Mode of Communication:  Video Conference via [email or web address]  Consent:  Patient has given verbal consent for video visit?: Yes         Psychiatry Clinic Progress Note                                                                   Shar Atkinson is a 25 year old male who returns to the clinic for follow-up care  Therapist:  None  PCP: Behrend, Robert D  Other Providers: None    Pertinent Background:  See previous notes.  Psych critical item history includes suicidal ideation.     Interim History                                                                                     " "                   4, 4     The patient is a good historian, reports good treatment adherence and was last seen 9/9/20.  Since the last visit,  Shar reports he is \"ok.\"  He reports \"I shut down at the beginning of November.\"  At this time, he tested positive for Covid19,  he experienced decreased motivation and focus.  As a result, he fell behind in school.  Since restarting Zoloft, Shar has noticed improvement in symptoms.  He also is requesting a note for extensions for late assignment.  This is second occasion he has asked me to draft letter requesting academic accommodations.  Also reports decrease in appetite and would like Concerta to be decreased .    9/9/20:  Shar is requesting to taper off his psychiatric medications.  He states his \"life is pretty good right now.\"  He also shares that relationships with family.  Mood and anxiety are stable.  Currently taking Lamictal 50mg and not changes to mood.  Will discontinue today.  He also decreased Zoloft to 50mg approximately a week ago.  Will meet again in a month to discuss possible discontinuation of Zoloft    7/16/20: Shar reports he tolerated the increase in Concerta and finds beneficial.   No concerns with side effects.  Taking summer classes at [employer].   Also working at [employer] for the summer. Sleep good.  Depression and anxiety stable.       6/10/20: Shar reports he is \"happy.\"  Periodic irritability.  He and his boyfriend just returned from 2 week trip to [place].  Sleep is \"pretty good.\" He will be taking classes at [employer] during summer months.     4/7/20: Shar reports Covid 19 associated lifestyle changes have impacted mood but increase in Zoloft has been helpful.  No concerns with mood dysregulation and irritability.  Sleep also not a concern.    3/10/20: Shar reports he did struggle with depression during month of February.  He reports his mood typically lowers during winter months but duration this season seems longer.  Feels a like a failure " "due to getting D in class last semester which has led to delaying graduations.  Endorses anhedonia, psychomotor slowing, lack of motivation, and decreased sleep.  Has been using melatonin 5mg and finds helpful.  Lamictal continues to be helpful in management of irritability and mood fluctuations.  Advised to see therapist.  [facility]    11/12/19: Shar was able to have his dog as a support animal which has been extremely helpful.  Overall, \"life is pretty good.\"  He feels that both stress and his ability to manage stress has improved.  Decreased family turmoil.  Both older sister and father have obtained sobriety.  Continues to endorse academic success but will miss class occasionally.  He moved in with his boyfriend and a roommate.  Considering moving to [place] after graduation.  Will use Propranolol primarily for exams.      8/13/19: Shar reports his summer has been \"busy.\"  States he is \"pretty good overall\" and is reacting to events appropriately.  He is facing increased stress including family stress, having to move, and preparing next school year.  Sleeps through the night and no issue falling asleep.  Continues to endorse that Lamictal is managing mood fluctuations and impulsivity.  Shar will investigate process for obtaining emotional support animal and relay back to clinic    5/14/19: Shar reports that he is \"doing good overall.\"  Stressed due to being in midst of finals week which he attributes to worsening mood.  No concerns with academics. 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 He believes the Lamictal has been \"evening out his mood.\"  No concerns with impulsivity including sexual promiscuity and excessive spending.  No concerns with sleep.  He is interviewing for summer internship currently.  Continues to take Concerta regularly and finds helpful with management of attention deficits.      2/26/19: " "Shar reports since starting Lamictal that he feels that mood has leveled out.  He reports irritability and mood elevation has decreased.    He is feeling less of the \"highs.\"  States impulsive spending has ended but does continue to experience some promiscuity.  Overall, he believes that the medication has been helpful in managing mood.  Shar has experienced significant stressor including losing friend, discovering partner was cheating, familial stress, and breaking hand.  Also reports experiencing low mood several days since last appointment. 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  He reports increased mood fluctuations since returning to school and increased impulsive behavior (promiscuity and spending money).   He also reports increased irritability almost daily and worsening of depression symptoms.  Shar also reports typically experiencing worsening of mood when winter comes.  Reports needing to sleep each night but struggles to fall asleep due to racing thoughts.  He also reports increased anxiety especially worrying about classes and studies.  Continues to take Concerta and finds helpful.  Does not attribute worsening anxiety to Concerta.  Shar also reports today that he has had thyroid issues in past.      11/8/18: Shar reports that things are \"amazing...really, really good.\"  Currently in midst of midterms and managing stress effectively.  Depression is well managed by Zoloft.  No concerns with weight loss, increased anxiety, or sleep.  Even though Propranolol helpful, will hold due to history of asthma.  Shar will make appointment and W will attempt to contact PCP    6/5/18: Shar reports he successfully completed his sophomore year.  He plans on staying in [facility] for summer.  Stopped therapy due to thinking he was not staying in [place] [facility] for summer.  He does not plan to continue as he is doing much better in " regards to mental health. He does report that he has been sleeping more since school ended and due to not having a schedule.  Shar reports the Sertraline 100mg daily continues to be helpful in managing mood and anxiety.        Recent Symptoms:   Depression:  occasional irritability and low mood  Elevated:  none  Psychosis:  none  Anxiety:  periodic worry  Panic Attack:  none  Trauma Related:  none     Recent Substance Use:  no changes reported        Social/ Family History                                  [per patient report]                                 1ea,1ea   FINANCIAL SUPPORT- Full-time student at the [employer]       FEELS SAFE AT HOME- Yes    Medical / Surgical History                                                                                                                There is no problem list on file for this patient.      No past surgical history on file.     Medical Review of Systems                                                                                                    2,10   A comprehensive review of systems was performed and is negative other than noted in the HPI.  SVT (hx).  Asthma.  Allergy                                Nuts, Peas, and Seafood  Current Medications                                                                                                       Current Outpatient Medications   Medication Sig Dispense Refill     albuterol (PROAIR HFA/PROVENTIL HFA/VENTOLIN HFA) 108 (90 BASE) MCG/ACT Inhaler   0     methylphenidate (CONCERTA) 54 MG CR tablet Take 1 tablet (54 mg) by mouth every morning for 16 days 16 tablet 0     methylphenidate (CONCERTA) 54 MG CR tablet Take 1 tablet (54 mg) by mouth every morning 30 tablet 0     methylphenidate (CONCERTA) 54 MG CR tablet Take 1 tablet (54 mg) by mouth every morning 30 tablet 0     propranolol (INDERAL) 20 MG tablet Take 1 tablet (20 mg) by mouth 2 times daily As needed for anxiety 60 tablet 1     sertraline (ZOLOFT) 100 MG  "tablet Take 1 tablet (100 mg) by mouth daily 30 tablet 0     Vitals                                                                                                                       3, 3   There were no vitals taken for this visit.   Mental Status Exam                                                                                    9, 14 cog gs     Alertness: alert  and oriented  Appearance: adequately groomed  Behavior/Demeanor: cooperative, pleasant and calm, with good  eye contact   Speech: regular rate and rhythm  Language: good  Psychomotor: normal or unremarkable  Mood: \"ok\"  Affect: appropriate; was congruent to mood; was congruent to content  Thought Process/Associations: unremarkable  Thought Content:  Reports none;  Denies suicidal ideation, violent ideation, delusions and paranoid ideation  Perception:  Reports none;  Denies auditory hallucinations and visual hallucinations  Insight: good  Judgment: good  Cognition: (6) does  appear grossly intact; formal cognitive testing was not done  Gait/Station and/or Muscle Strength/Tone: unable to assess    Labs and Data                                                                                                                 Rating Scales:    PHQ9    PHQ9 Today:  Not completed  PHQ-9 SCORE 6/7/2019 8/13/2019 11/12/2019   PHQ-9 Total Score 8 9 8         Diagnosis and Assessment                                                                             m2, h3     Today the following issues were addressed:    1) Major Depressive Disorder, recurrent, moderate  2) Generalized Anxiety Disorder  3) ADHD (Hx)     [place] Prescription Monitoring Program [] was checked today:  indicates methylphenidate 30mg filled regularly .     PSYCHOTROPIC DRUG INTERACTIONS: Methylphenidate-Sertraline: Concurrent use of METHYLPHENIDATE and SELECTIVE SEROTONIN REUPTAKE INHIBITORS may result in increased selective serotonin reuptake inhibitor plasma concentrations.  " Propranolol-Sertraline: Concurrent use of PROPRANOLOL and SERTRALINE may result in an increased risk of chest pain. .    Plan                                                                                                                    m2, h3      1) Management of mood and anxiety  Therapy- Continue  Medications-   Continue Sertraline 100mg daily  Continue Propranolol 20mg TID PRN for performance anxiety and periodic anxiety due to asthma diagnosis.  Asthma well controlled.     2) Attention management  Medications-   Decrease Concerta to 36mg daily     RTC: 1 month  CRISIS NUMBERS:   Provided routinely in AVS.    Treatment Risk Statement:  The patient understands the risks, benefits, adverse effects and alternatives. Agrees to treatment with the capacity to do so. No medical contraindications to treatment. Agrees to call clinic for any problems. The patient understands to call 911 or go to the nearest ED if life threatening or urgent symptoms occur.     PROVIDER:  JOMAR Palm CNP

## 2020-12-18 NOTE — TELEPHONE ENCOUNTER
Per provider request, letter sent to patient [facility] intake e-mail.  Phone call placed to patient alerting him to get his e-mail for the letter.

## 2021-01-03 DIAGNOSIS — F33.1 MAJOR DEPRESSIVE DISORDER, RECURRENT EPISODE, MODERATE (H): ICD-10-CM

## 2021-01-06 RX ORDER — SERTRALINE HYDROCHLORIDE 100 MG/1
100 TABLET, FILM COATED ORAL DAILY
Qty: 30 TABLET | Refills: 0 | Status: SHIPPED | OUTPATIENT
Start: 2021-01-06 | End: 2021-01-14

## 2021-01-07 NOTE — TELEPHONE ENCOUNTER
Medication requested: SERTRALINE  MG TABLET  Last refilled: 11/12/20  Qty: 30      Last seen: 12/2/20  RTC: 1 month  Cancel: 0  No-show: 0  Next appt: 0    Refill decision:   30 day kim refill sent to the pharmacy - including instructions for patient to call the clinic and schedule an appointment.  Scheduling has been notified to contact the pt for appointment.

## 2021-01-14 ENCOUNTER — TELEPHONE (OUTPATIENT)
Dept: PSYCHIATRY | Facility: CLINIC | Age: 26
End: 2021-01-14

## 2021-01-14 ENCOUNTER — VIRTUAL VISIT (OUTPATIENT)
Dept: PSYCHIATRY | Facility: CLINIC | Age: 26
End: 2021-01-14
Attending: NURSE PRACTITIONER
Payer: COMMERCIAL

## 2021-01-14 DIAGNOSIS — F90.0 ATTENTION DEFICIT HYPERACTIVITY DISORDER, INATTENTIVE TYPE: Primary | ICD-10-CM

## 2021-01-14 DIAGNOSIS — F33.1 MAJOR DEPRESSIVE DISORDER, RECURRENT EPISODE, MODERATE (H): ICD-10-CM

## 2021-01-14 PROCEDURE — 99213 OFFICE O/P EST LOW 20 MIN: CPT | Mod: 95 | Performed by: NURSE PRACTITIONER

## 2021-01-14 RX ORDER — METHYLPHENIDATE HYDROCHLORIDE 36 MG/1
36 TABLET ORAL DAILY
Qty: 30 TABLET | Refills: 0 | Status: SHIPPED | OUTPATIENT
Start: 2021-03-17 | End: 2021-04-16

## 2021-01-14 RX ORDER — METHYLPHENIDATE HYDROCHLORIDE 36 MG/1
36 TABLET ORAL DAILY
Qty: 30 TABLET | Refills: 0 | Status: SHIPPED | OUTPATIENT
Start: 2021-01-14 | End: 2021-02-13

## 2021-01-14 RX ORDER — METHYLPHENIDATE HYDROCHLORIDE 36 MG/1
36 TABLET ORAL DAILY
Qty: 30 TABLET | Refills: 0 | Status: SHIPPED | OUTPATIENT
Start: 2021-02-14 | End: 2021-03-16

## 2021-01-14 NOTE — PROGRESS NOTES
"VIDEO VISIT  Shar Atkinson is a 25 year old patient who is being evaluated via a billable video visit.      The patient has been notified of following:   \"We have found that certain health care needs can be provided without the need for an in-person physical exam. This service lets us provide the care you need with a video conversation. If a prescription is necessary we can send it directly to your pharmacy. If lab work is needed we can place an order for that and you can then stop by our lab to have the test done at a later time. Insurers are generally covering virtual visits as they would in-office visits so billing should not be different than normal.  If for some reason you do get billed incorrectly, you should contact the billing office to correct it and that number is in the AVS .    Patient has given verbal consent for video visit?: Yes   How would you like to obtain your AVS?: not requested  AVS SmartPhrase [PsychAVS] has been placed in 'Patient Instructions': N/A      Video- Visit Details  Type of service:  video visit for medication management  Time of service:    Date:  01/14/2021    Video Start Time:  10:09am        Video End Time:  10:19am    Reason for video visit:  Patient unable to travel due to Covid-19  Originating Site (patient location):  [facility] [place]   Location- Patient's home  Distant Site (provider location):  [facility]  Mode of Communication:  Video Conference via [email or web address]  Consent:  Patient has given verbal consent for video visit?: Yes       Psychiatry Clinic Progress Note                                                                   Shar Atkinson is a 25 year old male who returns to the clinic for follow-up care  Therapist:  None  PCP: Behrend, Robert D  Other Providers: None    Pertinent Background:  See previous notes.  Psych critical item history includes suicidal ideation.     Interim History                                                                             " "                           4, 4     The patient is a good historian, reports good treatment adherence and was last seen 12/2/20.  Since the last visit,  Shar reports \"I'm doing really well.\"  Decrease in Concerta continues to be effective in managing attention and Shar is experiencing less side effects, especially appetite suppression.  Shar decreased Zoloft to 50mg when school semester ended.  Will remain at 50mg and reasses in spring.     12/2/20: Shar reports he is \"ok.\"  He reports \"I shut down at the beginning of November.\"  At this time, he tested positive for Covid19,  he experienced decreased motivation and focus.  As a result, he fell behind in school.  Since restarting Zoloft, Shar has noticed improvement in symptoms.  He also is requesting a note for extensions for late assignment.  This is second occasion he has asked me to draft letter requesting academic accommodations.  Also reports decrease in appetite and would like Concerta to be decreased .    9/9/20:  Shar is requesting to taper off his psychiatric medications.  He states his \"life is pretty good right now.\"  He also shares that relationships with family.  Mood and anxiety are stable.  Currently taking Lamictal 50mg and not changes to mood.  Will discontinue today.  He also decreased Zoloft to 50mg approximately a week ago.  Will meet again in a month to discuss possible discontinuation of Zoloft    7/16/20: Shar reports he tolerated the increase in Concerta and finds beneficial.   No concerns with side effects.  Taking summer classes at [employer].   Also working at [employer] for the summer. Sleep good.  Depression and anxiety stable.       6/10/20: Shar reports he is \"happy.\"  Periodic irritability.  He and his boyfriend just returned from 2 week trip to [place].  Sleep is \"pretty good.\" He will be taking classes at [employer] during summer months.     4/7/20: Shar reports Covid 19 associated lifestyle changes have impacted mood but increase in " "Zoloft has been helpful.  No concerns with mood dysregulation and irritability.  Sleep also not a concern.    3/10/20: Shar reports he did struggle with depression during month of February.  He reports his mood typically lowers during winter months but duration this season seems longer.  Feels a like a failure due to getting D in class last semester which has led to delaying graduations.  Endorses anhedonia, psychomotor slowing, lack of motivation, and decreased sleep.  Has been using melatonin 5mg and finds helpful.  Lamictal continues to be helpful in management of irritability and mood fluctuations.  Advised to see therapist.  Gave contact information to [facility]    11/12/19: Shar was able to have his dog as a support animal which has been extremely helpful.  Overall, \"life is pretty good.\"  He feels that both stress and his ability to manage stress has improved.  Decreased family turmoil.  Both older sister and father have obtained sobriety.  Continues to endorse academic success but will miss class occasionally.  He moved in with his boyfriend and a roommate.  Considering moving to [place] after graduation.  Will use Propranolol primarily for exams.      8/13/19: Shar reports his summer has been \"busy.\"  States he is \"pretty good overall\" and is reacting to events appropriately.  He is facing increased stress including family stress, having to move, and preparing next school year.  Sleeps through the night and no issue falling asleep.  Continues to endorse that Lamictal is managing mood fluctuations and impulsivity.  Shar will investigate process for obtaining emotional support animal and relay back to clinic    5/14/19: Shar reports that he is \"doing good overall.\"  Stressed due to being in midst of finals week which he attributes to worsening mood.  No concerns with academics. 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 He believes the " "Lamictal has been \"evening out his mood.\"  No concerns with impulsivity including sexual promiscuity and excessive spending.  No concerns with sleep.  He is interviewing for summer internship currently.  Continues to take Concerta regularly and finds helpful with management of attention deficits.      2/26/19: Shar reports since starting Lamictal that he feels that mood has leveled out.  He reports irritability and mood elevation has decreased.    He is feeling less of the \"highs.\"  States impulsive spending has ended but does continue to experience some promiscuity.  Overall, he believes that the medication has been helpful in managing mood.  Shar has experienced significant stressor including losing friend, discovering partner was cheating, familial stress, and breaking hand.  Also reports experiencing low mood several days since last appointment. 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  He reports increased mood fluctuations since returning to school and increased impulsive behavior (promiscuity and spending money).   He also reports increased irritability almost daily and worsening of depression symptoms.  Shar also reports typically experiencing worsening of mood when winter comes.  Reports needing to sleep each night but struggles to fall asleep due to racing thoughts.  He also reports increased anxiety especially worrying about classes and studies.  Continues to take Concerta and finds helpful.  Does not attribute worsening anxiety to Concerta.  Shar also reports today that he has had thyroid issues in past.      11/8/18: Shar reports that things are \"amazing...really, really good.\"  Currently in midst of midterms and managing stress effectively.  Depression is well managed by Zoloft.  No concerns with weight loss, increased anxiety, or sleep.  Even though Propranolol helpful, will hold due to history of asthma.  " Shar will make appointment and W will attempt to contact PCP    6/5/18: Shar reports he successfully completed his sophomore year.  He plans on staying in [place] for summer.  Stopped therapy due to thinking he was not staying in [place] for summer.  He does not plan to continue as he is doing much better in regards to mental health. He does report that he has been sleeping more since school ended and due to not having a schedule.  Shar reports the Sertraline 100mg daily continues to be helpful in managing mood and anxiety.        Recent Symptoms:   Depression:  not endorsed today  Elevated:  none  Psychosis:  none  Anxiety:  periodic worry  Panic Attack:  none  Trauma Related:  none     Recent Substance Use:  no changes reported        Social/ Family History                                  [per patient report]                                 1ea,1ea   FINANCIAL SUPPORT- Full-time student at the [employer]       FEELS SAFE AT HOME- Yes    Medical / Surgical History                                                                                                                There is no problem list on file for this patient.      No past surgical history on file.     Medical Review of Systems                                                                                                    2,10   A comprehensive review of systems was performed and is negative other than noted in the HPI.  SVT (hx).  Asthma.  Allergy                                Nuts, Peas, and Seafood  Current Medications                                                                                                       Current Outpatient Medications   Medication Sig Dispense Refill     albuterol (PROAIR HFA/PROVENTIL HFA/VENTOLIN HFA) 108 (90 BASE) MCG/ACT Inhaler   0     methylphenidate (CONCERTA) 36 MG CR tablet Take 1 tablet (36 mg) by mouth every morning 30 tablet 0     methylphenidate (CONCERTA) 54 MG CR tablet Take 1 tablet (54 mg) by mouth  every morning 30 tablet 0     methylphenidate (CONCERTA) 54 MG CR tablet Take 1 tablet (54 mg) by mouth every morning 30 tablet 0     propranolol (INDERAL) 20 MG tablet Take 1 tablet (20 mg) by mouth 2 times daily As needed for anxiety 60 tablet 1     sertraline (ZOLOFT) 100 MG tablet Take 1 tablet (100 mg) by mouth daily For more refills,schedule an appointment at [phone] 30 tablet 0     Vitals                                                                                                                       3, 3   There were no vitals taken for this visit.   Mental Status Exam                                                                                    9, 14 cog gs     Alertness: alert  and oriented  Appearance: adequately groomed  Behavior/Demeanor: cooperative, pleasant and calm, with good  eye contact   Speech: normal  Language: good  Psychomotor: normal or unremarkable  Mood: description consistent with euthymia  Affect: appropriate; was congruent to mood; was congruent to content  Thought Process/Associations: unremarkable  Thought Content:  Reports none;  Denies suicidal ideation, violent ideation, delusions and paranoid ideation  Perception:  Reports none;  Denies auditory hallucinations and visual hallucinations  Insight: good  Judgment: good  Cognition: (6) does  appear grossly intact; formal cognitive testing was not done  Gait/Station and/or Muscle Strength/Tone: unable to assess    Labs and Data                                                                                                                 Rating Scales:    PHQ9    PHQ9 Today:  Not completed  PHQ-9 SCORE 6/7/2019 8/13/2019 11/12/2019   PHQ-9 Total Score 8 9 8         Diagnosis and Assessment                                                                             m2, h3     Today the following issues were addressed:    1) Major Depressive Disorder, recurrent, moderate  2) Generalized Anxiety Disorder  3) ADHD (Hx)     [place]  Prescription Monitoring Program [] was checked today:  indicates methylphenidate 30mg filled regularly .     PSYCHOTROPIC DRUG INTERACTIONS: Methylphenidate-Sertraline: Concurrent use of METHYLPHENIDATE and SELECTIVE SEROTONIN REUPTAKE INHIBITORS may result in increased selective serotonin reuptake inhibitor plasma concentrations.  Propranolol-Sertraline: Concurrent use of PROPRANOLOL and SERTRALINE may result in an increased risk of chest pain. .    Plan                                                                                                                    m2, h3      1) Management of mood and anxiety  Therapy- Continue  Medications-   Continue Sertraline 50mg daily  Continue Propranolol 20mg TID PRN for performance anxiety and periodic anxiety due to asthma diagnosis.  Asthma well controlled.     2) Attention management  Medications-   Continue Concerta 36mg daily     RTC: 3 months  CRISIS NUMBERS:   Provided routinely in AVS.    Treatment Risk Statement:  The patient understands the risks, benefits, adverse effects and alternatives. Agrees to treatment with the capacity to do so. No medical contraindications to treatment. Agrees to call clinic for any problems. The patient understands to call 911 or go to the nearest ED if life threatening or urgent symptoms occur.     PROVIDER:  JOMAR Palm CNP

## 2021-01-14 NOTE — TELEPHONE ENCOUNTER
On January 14, 2021, at 9:01 AM, writer called patient at [phone] to confirm Virtual Visit. Writer unable to make contact with patient. Writer left detailed voice message for call back. [phone] left as call back number. Cinthya Pineda, [facility]

## 2021-04-07 DIAGNOSIS — F33.1 MAJOR DEPRESSIVE DISORDER, RECURRENT EPISODE, MODERATE (H): ICD-10-CM

## 2021-04-08 NOTE — TELEPHONE ENCOUNTER
sertraline (ZOLOFT) 50 MG  Last refilled: 1/14/21  Qty: 90    Last seen: 1/14/21  RTC: 3 MOS  Cancel: 0  No-show: 0  Next appt: NONE  Scheduling has been notified to contact the pt for appointment.  Refill decision: 30 day kim refill sent to the pharmacy - including instructions for patient to call the clinic and schedule an appointment.

## 2021-04-12 ENCOUNTER — VIRTUAL VISIT (OUTPATIENT)
Dept: PSYCHIATRY | Facility: CLINIC | Age: 26
End: 2021-04-12
Attending: NURSE PRACTITIONER
Payer: COMMERCIAL

## 2021-04-12 ENCOUNTER — TELEPHONE (OUTPATIENT)
Dept: PSYCHIATRY | Facility: CLINIC | Age: 26
End: 2021-04-12

## 2021-04-12 DIAGNOSIS — F90.0 ATTENTION DEFICIT HYPERACTIVITY DISORDER, INATTENTIVE TYPE: Primary | ICD-10-CM

## 2021-04-12 PROCEDURE — 99213 OFFICE O/P EST LOW 20 MIN: CPT | Mod: 95 | Performed by: NURSE PRACTITIONER

## 2021-04-12 RX ORDER — METHYLPHENIDATE HYDROCHLORIDE 36 MG/1
36 TABLET ORAL DAILY
Qty: 30 TABLET | Refills: 0 | Status: SHIPPED | OUTPATIENT
Start: 2021-05-13 | End: 2021-06-12

## 2021-04-12 RX ORDER — METHYLPHENIDATE HYDROCHLORIDE 36 MG/1
36 TABLET ORAL DAILY
Qty: 30 TABLET | Refills: 0 | Status: SHIPPED | OUTPATIENT
Start: 2021-06-13 | End: 2021-07-13

## 2021-04-12 RX ORDER — METHYLPHENIDATE HYDROCHLORIDE 36 MG/1
36 TABLET ORAL DAILY
Qty: 30 TABLET | Refills: 0 | Status: SHIPPED | OUTPATIENT
Start: 2021-04-12 | End: 2021-05-12

## 2021-04-12 NOTE — PROGRESS NOTES
"VIDEO VISIT  Shar Atkinson is a 25 year old patient who is being evaluated via a billable video visit.      The patient has been notified of following:   \"We have found that certain health care needs can be provided without the need for an in-person physical exam. This service lets us provide the care you need with a video conversation. If a prescription is necessary we can send it directly to your pharmacy. If lab work is needed we can place an order for that and you can then stop by our lab to have the test done at a later time. Insurers are generally covering virtual visits as they would in-office visits so billing should not be different than normal.  If for some reason you do get billed incorrectly, you should contact the billing office to correct it and that number is in the AVS .    Patient has given verbal consent for video visit?: Yes   How would you like to obtain your AVS?: not requested  AVS SmartPhrase [PsychAVS] has been placed in 'Patient Instructions': N/A      Video- Visit Details  Type of service:  video visit for medication management  Time of service:    Date:  04/12/2021    Video Start Time:  8:34 AM        Video End Time:  8:45am    Reason for video visit:  Patient unable to travel due to Covid-19  Originating Site (patient location):  [facility]   Location- Patient's home  Distant Site (provider location):  Remote location  Mode of Communication:  Video Conference via [employer]  Consent:  Patient has given verbal consent for video visit?: Yes       Psychiatry Clinic Progress Note                                                                   Shar Atkinson is a 25 year old male who returns to the clinic for follow-up care  Therapist:  None  PCP: Behrend, Robert D  Other Providers: None    Pertinent Background:  See previous notes.  Psych critical item history includes suicidal ideation.     Interim History                                                                                          " "              4, 4     The patient is a good historian, reports good treatment adherence and was last seen 1/14/21.  Since the last visit, Shar reports he is \"pretty good.\"  No concerns with depression or irritability.  Plans to move to [place] in June or July with his boyfriend.  No concerns with sleep.  Shar reports he also discontinued Sertraline approximately 2-3 months ago.  Felt he was \"weighed down by Sertraline.\"  Concerta 36mg continues to be effective in management of attention deficits.    1/14/21: Shar reports \"I'm doing really well.\"  Decrease in Concerta continues to be effective in managing attention and Shar is experiencing less side effects, especially appetite suppression.  Shar decreased Zoloft to 50mg when school semester ended.  Will remain at 50mg and reasses in spring.     12/2/20: Shar reports he is \"ok.\"  He reports \"I shut down at the beginning of November.\"  At this time, he tested positive for Covid19,  he experienced decreased motivation and focus.  As a result, he fell behind in school.  Since restarting Zoloft, Shar has noticed improvement in symptoms.  He also is requesting a note for extensions for late assignment.  This is second occasion he has asked me to draft letter requesting academic accommodations.  Also reports decrease in appetite and would like Concerta to be decreased .    9/9/20:  Shar is requesting to taper off his psychiatric medications.  He states his \"life is pretty good right now.\"  He also shares that relationships with family.  Mood and anxiety are stable.  Currently taking Lamictal 50mg and not changes to mood.  Will discontinue today.  He also decreased Zoloft to 50mg approximately a week ago.  Will meet again in a month to discuss possible discontinuation of Zoloft    7/16/20: Shar reports he tolerated the increase in Concerta and finds beneficial.   No concerns with side effects.  Taking summer classes at [employer].   Also working at [employer] for " "the summer. Sleep good.  Depression and anxiety stable.       6/10/20: Shar reports he is \"happy.\"  Periodic irritability.  He and his boyfriend just returned from 2 week trip to [place].  Sleep is \"pretty good.\" He will be taking classes at [employer] during summer months.     4/7/20: Shar reports Covid 19 associated lifestyle changes have impacted mood but increase in Zoloft has been helpful.  No concerns with mood dysregulation and irritability.  Sleep also not a concern.    3/10/20: Shar reports he did struggle with depression during month of February.  He reports his mood typically lowers during winter months but duration this season seems longer.  Feels a like a failure due to getting D in class last semester which has led to delaying graduations.  Endorses anhedonia, psychomotor slowing, lack of motivation, and decreased sleep.  Has been using melatonin 5mg and finds helpful.  Lamictal continues to be helpful in management of irritability and mood fluctuations.  Advised to see therapist.  Gave contact information to [facility]    11/12/19: Shar was able to have his dog as a support animal which has been extremely helpful.  Overall, \"life is pretty good.\"  He feels that both stress and his ability to manage stress has improved.  Decreased family turmoil.  Both older sister and father have obtained sobriety.  Continues to endorse academic success but will miss class occasionally.  He moved in with his boyfriend and a roommate.  Considering moving to [place] after graduation.  Will use Propranolol primarily for exams.      8/13/19: Shar reports his summer has been \"busy.\"  States he is \"pretty good overall\" and is reacting to events appropriately.  He is facing increased stress including family stress, having to move, and preparing next school year.  Sleeps through the night and no issue falling asleep.  Continues to endorse that Lamictal is managing mood fluctuations and " "impulsivity.  Shar will investigate process for obtaining emotional support animal and relay back to clinic    5/14/19: Shar reports that he is \"doing good overall.\"  Stressed due to being in midst of finals week which he attributes to worsening mood.  No concerns with academics. 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 He believes the Lamictal has been \"evening out his mood.\"  No concerns with impulsivity including sexual promiscuity and excessive spending.  No concerns with sleep.  He is interviewing for summer internship currently.  Continues to take Concerta regularly and finds helpful with management of attention deficits.      2/26/19: Shar reports since starting Lamictal that he feels that mood has leveled out.  He reports irritability and mood elevation has decreased.    He is feeling less of the \"highs.\"  States impulsive spending has ended but does continue to experience some promiscuity.  Overall, he believes that the medication has been helpful in managing mood.  Shar has experienced significant stressor including losing friend, discovering partner was cheating, familial stress, and breaking hand.  Also reports experiencing low mood several days since last appointment. 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  He reports increased mood fluctuations since returning to school and increased impulsive behavior (promiscuity and spending money).   He also reports increased irritability almost daily and worsening of depression symptoms.  Shar also reports typically experiencing worsening of mood when winter comes.  Reports needing to sleep each night but struggles to fall asleep due to racing thoughts.  He also reports increased anxiety especially worrying about classes and studies.  Continues to take Concerta and finds helpful.  Does not attribute worsening anxiety to Concerta.  " "Shar also reports today that he has had thyroid issues in past.      11/8/18: Shar reports that things are \"amazing...really, really good.\"  Currently in midst of midterms and managing stress effectively.  Depression is well managed by Zoloft.  No concerns with weight loss, increased anxiety, or sleep.  Even though Propranolol helpful, will hold due to history of asthma.  Shar will make appointment and W will attempt to contact PCP    6/5/18: Shar reports he successfully completed his sophomore year.  He plans on staying in [facility] [place] for summer.  Stopped therapy due to thinking he was not staying in [place] for summer.  He does not plan to continue as he is doing much better in regards to mental health. He does report that he has been sleeping more since school ended and due to not having a schedule.  Shar reports the Sertraline 100mg daily continues to be helpful in managing mood and anxiety.        Recent Symptoms:   Depression:  not endorsed today  Elevated:  none  Psychosis:  none  Anxiety:  periodic worry  Panic Attack:  none  Trauma Related:  none     Recent Substance Use:  no changes reported        Social/ Family History                                  [per patient report]                                 1ea,1ea   FINANCIAL SUPPORT- Full-time student at the [employer]       FEELS SAFE AT HOME- Yes    Medical / Surgical History                                                                                                                There is no problem list on file for this patient.      No past surgical history on file.     Medical Review of Systems                                                                                                    2,10   A comprehensive review of systems was performed and is negative other than noted in the HPI.  SVT (hx).  Asthma.  Allergy                                Nuts, Peas, and Seafood  Current Medications                                                               " "                                        Current Outpatient Medications   Medication Sig Dispense Refill     albuterol (PROAIR HFA/PROVENTIL HFA/VENTOLIN HFA) 108 (90 BASE) MCG/ACT Inhaler   0     methylphenidate (CONCERTA) 36 MG CR tablet Take 1 tablet (36 mg) by mouth daily 30 tablet 0     methylphenidate (CONCERTA) 36 MG CR tablet Take 1 tablet (36 mg) by mouth every morning 30 tablet 0     propranolol (INDERAL) 20 MG tablet Take 1 tablet (20 mg) by mouth 2 times daily As needed for anxiety 60 tablet 1     sertraline (ZOLOFT) 50 MG tablet Take 1 tablet (50 mg) by mouth daily *SCHEDULE CLINIC APPT. FOR REFILLS [phone]* 90 tablet 0     Vitals                                                                                                                       3, 3   There were no vitals taken for this visit.   Mental Status Exam                                                                                    9, 14 cog gs     Alertness: alert  and oriented  Appearance: adequately groomed  Behavior/Demeanor: cooperative, pleasant and calm, with good  eye contact   Speech: regular rate and rhythm  Language: good  Psychomotor: normal or unremarkable  Mood: \"pretty good\"  Affect: appropriate; was congruent to mood; was congruent to content  Thought Process/Associations: unremarkable  Thought Content:  Reports none;  Denies suicidal ideation, violent ideation, delusions and paranoid ideation  Perception:  Reports none;  Denies auditory hallucinations and visual hallucinations  Insight: good  Judgment: good  Cognition: (6) does  appear grossly intact; formal cognitive testing was not done  Gait/Station and/or Muscle Strength/Tone: unable to assess    Labs and Data                                                                                                                 Rating Scales:    PHQ9    PHQ9 Today:  Not completed  PHQ-9 SCORE 6/7/2019 8/13/2019 11/12/2019   PHQ-9 Total Score 8 9 8         Diagnosis and " Assessment                                                                             m2, h3     Today the following issues were addressed:    1) Major Depressive Disorder, recurrent, moderate  2) Generalized Anxiety Disorder  3) ADHD (Hx)     [place] Prescription Monitoring Program [] was checked today:  indicates filling controlled medications responsibly.           Plan                                                                                                                    m2, h3      1) Management of mood and anxiety  Therapy- Continue  Medications-   Discontinue Sertraline 50mg daily  Continue Propranolol 20mg TID PRN for performance anxiety and periodic anxiety due to asthma diagnosis.  Asthma well controlled.  Uses very infrequently    2) Attention management  Medications-   Continue Concerta 36mg daily     RTC: 3 months  CRISIS NUMBERS:   Provided routinely in AVS.    Treatment Risk Statement:  The patient understands the risks, benefits, adverse effects and alternatives. Agrees to treatment with the capacity to do so. No medical contraindications to treatment. Agrees to call clinic for any problems. The patient understands to call 911 or go to the nearest ED if life threatening or urgent symptoms occur.     PROVIDER:  JOMAR Palm CNP

## 2021-04-12 NOTE — TELEPHONE ENCOUNTER
On 4/12/2021, at 8:15, writer called patient at [phone] to confirm Virtual Visit. Writer unable to make contact with patient. Writer left detailed voice message for call back. [phone] left as call back number. Dana Batista, [facility]

## 2021-07-29 NOTE — PROGRESS NOTES
Psychiatry Clinic Progress Note                                                                   Shar Atkinson is a 22 year old male who returns to the clinic for follow-up care  Therapist: BRITTANIE Wong  PCP: Behrend, Robert D  Other Providers: None    Pertinent Background:  See previous notes.  Psych critical item history includes suicidal ideation.     Interim History                                                                                                        4, 4     The patient is a good historian, reports good treatment adherence and was last seen 4/24/18.  Since the last visit, Shar reports he successfully completed his sophomore year.  He plans on staying in [place] for summer.  Stopped therapy due to thinking he was not staying in [place] for summer.  He does not plan to continue as he is doing much better in regards to mental health. He does report that he has been sleeping more since school ended and due to not having a schedule.  Shar reports the Sertraline 100mg daily continues to be helpful in managing mood and anxiety.        4/24/18: Shar reports he is doing better.  He states the change in weather has helped his mood.  Reports Sertraline is helpful in managing both his depression and anxiety.  He still reports some concerns regarding anxiety.  School and family continue to be stressors.  Shar reports his father recently relapsed and is receiving care.  His sister continues to be sober.  Shar continues to see his therapist weekly.  He is taking Propranolol but is not noticing much change.  He does not endorse dizziness, syncope, or excessive sedation.  He reports sleep has been issue during the past 2 weeks.      Recent Symptoms:   Depression:  low energy, insomnia and poor concentration /memory  Elevated:  none  Psychosis:  none  Anxiety:  reports signficant improvement.  Does endorse some anxiety regarding summertime employment  Panic Attack:  none  Trauma Related:  none    I did call the patient and I let her know for MRI results including nondisplaced fracture involving proximal tibia.  Told her to continue the protected nonweightbearing to the extremity with crutches and if still follow-up tomorrow as I like to get her into a more protective brace.  Patient is understanding of this.  Also advised Tylenol for pain control versus anti-inflammatories as we do not want to inhibit any bone healing.  She will follow-up tomorrow scheduled.     Recent Substance Use:  no changes reported        Social/ Family History                                  [per patient report]                                 1ea,1ea   FINANCIAL SUPPORT- Full-time student at the [employer]       FEELS SAFE AT HOME- Yes    Medical / Surgical History                                                                                                                There is no problem list on file for this patient.      No past surgical history on file.     Medical Review of Systems                                                                                                    2,10   A comprehensive review of systems was performed and is negative other than noted in the HPI.  SVT (hx).  Asthma.  Allergy                                Nuts; Peas; and Seafood  Current Medications                                                                                                       Current Outpatient Prescriptions   Medication Sig Dispense Refill     albuterol (PROAIR HFA/PROVENTIL HFA/VENTOLIN HFA) 108 (90 BASE) MCG/ACT Inhaler   0     Methylphenidate HCl (CONCERTA PO) Take 30 mg by mouth       propranolol (INDERAL) 10 MG tablet Take 2 tablets (20mg) as needed for anxiety       sertraline (ZOLOFT) 100 MG tablet Take 1 tablet (100 mg) by mouth daily 30 tablet 0     Vitals                                                                                                                       3, 3   /76  Pulse 69  Wt 71.8 kg (158 lb 6.4 oz)   Mental Status Exam                                                                                    9, 14 cog gs     Alertness: alert  and oriented  Appearance: casually groomed  Behavior/Demeanor: cooperative and pleasant, with good  eye contact   Speech: normal  Language: good  Psychomotor: normal or unremarkable  Mood: description consistent with euthymia  Affect: full range; was congruent to mood; was congruent to content  Thought  Process/Associations: unremarkable  Thought Content:  Reports none;  Denies suicidal ideation, violent ideation, delusions and paranoid ideation  Perception:  Reports none;  Denies auditory hallucinations and visual hallucinations  Insight: good  Judgment: good  Cognition: (6) does  appear grossly intact; formal cognitive testing was not done  Gait/Station and/or Muscle Strength/Tone: unremarkable    Labs and Data                                                                                                                 Rating Scales:    PHQ9    PHQ9 Today:  6  PHQ-9 SCORE 3/22/2018 4/24/2018   Total Score 21 14         Diagnosis and Assessment                                                                             m2, h3     Today the following issues were addressed:    1) Major Depressive Disorder, recurrent, moderate  2) Generalized Anxiety Disorder  3) ADHD (Hx)     [place] Prescription Monitoring [place] [] was checked today:  indicates methylphenidate 30mg filled regularly .     PSYCHOTROPIC DRUG INTERACTIONS: Methylphenidate-Sertraline: Concurrent use of METHYLPHENIDATE and SELECTIVE SEROTONIN REUPTAKE INHIBITORS may result in increased selective serotonin reuptake inhibitor plasma concentrations.  Propranolol-Sertraline: Concurrent use of PROPRANOLOL and SERTRALINE may result in an increased risk of chest pain. .    Plan                                                                                                                    m2, h3      1) Management of mood and anxiety  Therapy- Continue  Medications-   Continue Sertraline 100mg daily  Continue Propranolol 20mg TID PRN for performance anxiety and periodic anxiety     2) Attention management  Medications-   Continue Methylphenidate 30mg  (Metadate CD 30mg)      RTC: 3 months  CRISIS NUMBERS:   Provided routinely in AVS.    Treatment Risk Statement:  The patient understands the risks, benefits, adverse effects and alternatives. Agrees to  treatment with the capacity to do so. No medical contraindications to treatment. Agrees to call clinic for any problems. The patient understands to call 911 or go to the nearest ED if life threatening or urgent symptoms occur.     PROVIDER:  JOMAR Palm CNP

## 2021-08-12 DIAGNOSIS — F90.0 ATTENTION DEFICIT HYPERACTIVITY DISORDER, INATTENTIVE TYPE: ICD-10-CM

## 2021-08-12 RX ORDER — METHYLPHENIDATE HYDROCHLORIDE 36 MG/1
36 TABLET ORAL EVERY MORNING
Qty: 30 TABLET | Refills: 0 | Status: SHIPPED | OUTPATIENT
Start: 2021-08-12

## 2021-08-12 NOTE — TELEPHONE ENCOUNTER
[facility] Call Center    Phone Message    May a detailed message be left on voicemail: yes     Reason for Call: Medication Refill Request Concerta 36 mg  Has the patient contacted the pharmacy for the refill? Yes   Name of medication being requested: Concerta 36 mg  Provider who prescribed the medication: Diego Kwok  Pharmacy: [facility]  Date medication is needed: No         Action Taken: Message routed to:  Other: Keily Henderson    Travel Screening: Not Applicable

## 2021-08-12 NOTE — TELEPHONE ENCOUNTER
Last seen: 4/12/21  RTC: 3 months  Cancel: none  No-show: none  Next appt: 8/31/21     Medication requested: methylphenidate (CONCERTA) 36 MG CR tablet  Directions: Take 1 tablet (36 mg) by mouth every morning  Qty: 30  Last refilled: 7/13/21, 6/15/21, and 5/11/21 per [place]      Medication refill approved per refill protocol    Rx pended and routed to covering providers for approval

## 2021-08-31 ENCOUNTER — TELEPHONE (OUTPATIENT)
Dept: PSYCHIATRY | Facility: CLINIC | Age: 26
End: 2021-08-31

## 2021-08-31 ENCOUNTER — VIRTUAL VISIT (OUTPATIENT)
Dept: PSYCHIATRY | Facility: CLINIC | Age: 26
End: 2021-08-31
Attending: NURSE PRACTITIONER
Payer: COMMERCIAL

## 2021-08-31 DIAGNOSIS — F90.0 ATTENTION DEFICIT HYPERACTIVITY DISORDER, INATTENTIVE TYPE: ICD-10-CM

## 2021-08-31 PROCEDURE — 99214 OFFICE O/P EST MOD 30 MIN: CPT | Mod: 95 | Performed by: NURSE PRACTITIONER

## 2021-08-31 RX ORDER — METHYLPHENIDATE HYDROCHLORIDE 36 MG/1
36 TABLET ORAL DAILY
Qty: 30 TABLET | Refills: 0 | Status: SHIPPED | OUTPATIENT
Start: 2021-09-11 | End: 2021-10-11

## 2021-08-31 RX ORDER — METHYLPHENIDATE HYDROCHLORIDE 36 MG/1
36 TABLET ORAL DAILY
Qty: 30 TABLET | Refills: 0 | Status: SHIPPED | OUTPATIENT
Start: 2021-11-08 | End: 2021-12-08

## 2021-08-31 RX ORDER — METHYLPHENIDATE HYDROCHLORIDE 36 MG/1
36 TABLET ORAL DAILY
Qty: 30 TABLET | Refills: 0 | Status: SHIPPED | OUTPATIENT
Start: 2021-10-10 | End: 2021-11-09

## 2021-08-31 ASSESSMENT — PAIN SCALES - GENERAL: PAINLEVEL: NO PAIN (0)

## 2021-08-31 NOTE — PROGRESS NOTES
"Video- Visit Details  Type of service:  video visit for medication management  Time of service:    Date:  08/31/2021    Video Start Time:  5:34 PM        Video End Time:  5:50pm    Reason for video visit:  Patient unable to travel due to Covid-19  Originating Site (patient location):  [facility]   Location- Patient's home  Distant Site (provider location):  Remote location  Mode of Communication:  Video Conference via AmWell  Consent:  Patient has given verbal consent for video visit?: Yes     VIDEO VISIT  Shar Atkinson is a 26 year old patient who is being evaluated via a billable video visit.      The patient has been notified of following:   \"This video visit will be conducted via a call between you and your physician/provider. We have found that certain health care needs can be provided without the need for an in-person physical exam. This service lets us provide the care you need with a video conversation. If a prescription is necessary we can send it directly to your pharmacy. If lab work is needed we can place an order for that and you can then stop by our lab to have the test done at a later time. Insurers are generally covering virtual visits as they would in-office visits so billing should not be different than normal.  If for some reason you do get billed incorrectly, you should contact the billing office to correct it and that number is in the AVS .    Video Conference to be completed via:  Amwell    Patient has given verbal consent for video visit?:  Yes    Patient would prefer that any video invitations be sent by: Send to e-mail at: [email or web address]      How would patient like to obtain AVS?:  OnThe[employer] Platforms    AVS SmartPhrase [PsychAVS] has been placed in 'Patient Instructions':  Yes      Psychiatry Clinic Progress Note                                                                   Shar Atkinson is a 26 year old male who returns to the clinic for follow-up care  Therapist:  None  PCP: Behrend, Robert " "D  Other Providers: None    Pertinent Background:  See previous notes.  Psych critical item history includes suicidal ideation.     Interim History                                                                                                        4, 4     The patient is a good historian, reports good treatment adherence and was last seen 4/12/21.  Since the last visit, Shar reports he is \"ok.\"  He accepted job as  at .  He will start in 2 weeks.  Lack of schedule has impacted mood but mood described as \"pretty good.\"  No concerns with sleep.  He has been working on adjusting his sleep schedule so he is prepared for new work schedule.  Will reassess efficacy of stimulant at next appointment after Shar has been working for 3 months.  He does express some concern that effects of medication may wear off before end of work day.     4/12/21: Shar reports he is \"pretty good.\"  No concerns with depression or irritability.  Plans to move to [place] in June or July with his boyfriend.  No concerns with sleep.  Shar reports he also discontinued Sertraline approximately 2-3 months ago.  Felt he was \"weighed down by Sertraline.\"  Concerta 36mg continues to be effective in management of attention deficits.    1/14/21: Shar reports \"I'm doing really well.\"  Decrease in Concerta continues to be effective in managing attention and Shar is experiencing less side effects, especially appetite suppression.  Shar decreased Zoloft to 50mg when school semester ended.  Will remain at 50mg and reasses in spring.     12/2/20: Shar reports he is \"ok.\"  He reports \"I shut down at the beginning of November.\"  At this time, he tested positive for Covid19,  he experienced decreased motivation and focus.  As a result, he fell behind in school.  Since restarting Zoloft, Shar has noticed improvement in symptoms.  He also is requesting a note for extensions for late assignment.  This is second occasion he has asked " "me to draft letter requesting academic accommodations.  Also reports decrease in appetite and would like Concerta to be decreased .    9/9/20:  Shar is requesting to taper off his psychiatric medications.  He states his \"life is pretty good right now.\"  He also shares that relationships with family.  Mood and anxiety are stable.  Currently taking Lamictal 50mg and not changes to mood.  Will discontinue today.  He also decreased Zoloft to 50mg approximately a week ago.  Will meet again in a month to discuss possible discontinuation of Zoloft    7/16/20: Shar reports he tolerated the increase in Concerta and finds beneficial.   No concerns with side effects.  Taking summer classes at [employer].   Also working at [employer] for the summer. Sleep good.  Depression and anxiety stable.       6/10/20: Shar reports he is \"happy.\"  Periodic irritability.  He and his boyfriend just returned from 2 week trip to [place].  Sleep is \"pretty good.\" He will be taking classes at [employer] during summer months.     4/7/20: Shar reports Covid 19 associated lifestyle changes have impacted mood but increase in Zoloft has been helpful.  No concerns with mood dysregulation and irritability.  Sleep also not a concern.    3/10/20: Shar reports he did struggle with depression during month of February.  He reports his mood typically lowers during winter months but duration this season seems longer.  Feels a like a failure due to getting D in class last semester which has led to delaying graduations.  Endorses anhedonia, psychomotor slowing, lack of motivation, and decreased sleep.  Has been using melatonin 5mg and finds helpful.  Lamictal continues to be helpful in management of irritability and mood fluctuations.  Advised to see therapist.  Gave contact information to [facility]    11/12/19: Shar was able to have his dog as a support animal which has been extremely helpful.  Overall, \"life is pretty good.\"  He feels " "that both stress and his ability to manage stress has improved.  Decreased family turmoil.  Both older sister and father have obtained sobriety.  Continues to endorse academic success but will miss class occasionally.  He moved in with his boyfriend and a roommate.  Considering moving to [place] after graduation.  Will use Propranolol primarily for exams.      8/13/19: Shar reports his summer has been \"busy.\"  States he is \"pretty good overall\" and is reacting to events appropriately.  He is facing increased stress including family stress, having to move, and preparing next school year.  Sleeps through the night and no issue falling asleep.  Continues to endorse that Lamictal is managing mood fluctuations and impulsivity.  Shar will investigate process for obtaining emotional support animal and relay back to clinic    5/14/19: Shar reports that he is \"doing good overall.\"  Stressed due to being in midst of finals week which he attributes to worsening mood.  No concerns with academics. 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 He believes the Lamictal has been \"evening out his mood.\"  No concerns with impulsivity including sexual promiscuity and excessive spending.  No concerns with sleep.  He is interviewing for summer internship currently.  Continues to take Concerta regularly and finds helpful with management of attention deficits.      2/26/19: Shar reports since starting Lamictal that he feels that mood has leveled out.  He reports irritability and mood elevation has decreased.    He is feeling less of the \"highs.\"  States impulsive spending has ended but does continue to experience some promiscuity.  Overall, he believes that the medication has been helpful in managing mood.  Shar has experienced significant stressor including losing friend, discovering partner was cheating, familial stress, and breaking hand.  Also reports experiencing low mood several days since " "last appointment. 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  He reports increased mood fluctuations since returning to school and increased impulsive behavior (promiscuity and spending money).   He also reports increased irritability almost daily and worsening of depression symptoms.  Shar also reports typically experiencing worsening of mood when winter comes.  Reports needing to sleep each night but struggles to fall asleep due to racing thoughts.  He also reports increased anxiety especially worrying about classes and studies.  Continues to take Concerta and finds helpful.  Does not attribute worsening anxiety to Concerta.  Shar also reports today that he has had thyroid issues in past.      11/8/18: Shar reports that things are \"amazing...really, really good.\"  Currently in midst of midterms and managing stress effectively.  Depression is well managed by Zoloft.  No concerns with weight loss, increased anxiety, or sleep.  Even though Propranolol helpful, will hold due to history of asthma.  Shar will make appointment and W will attempt to contact PCP    6/5/18: Shar reports he successfully completed his sophomore year.  He plans on staying in [place] for summer.  Stopped therapy due to thinking he was not staying in [place] for summer.  He does not plan to continue as he is doing much better in regards to mental health. He does report that he has been sleeping more since school ended and due to not having a schedule.  Shar reports the Sertraline 100mg daily continues to be helpful in managing mood and anxiety.        Recent Symptoms:   Depression:  not endorsed today  Elevated:  none  Psychosis:  none  Anxiety:  periodic worry  Panic Attack:  none  Trauma Related:  none     Recent Substance Use:  no changes reported        Social/ Family History                                  [per patient report]                           " "      1ea,1ea   FINANCIAL SUPPORT- Full-time student at the [employer]       FEELS SAFE AT HOME- Yes    Medical / Surgical History                                                                                                                There is no problem list on file for this patient.      No past surgical history on file.     Medical Review of Systems                                                                                                    2,10   A comprehensive review of systems was performed and is negative other than noted in the HPI.  SVT (hx).  Asthma.  Allergy                                Nuts, Peas, and Seafood  Current Medications                                                                                                       Current Outpatient Medications   Medication Sig Dispense Refill     albuterol (PROAIR HFA/PROVENTIL HFA/VENTOLIN HFA) 108 (90 BASE) MCG/ACT Inhaler   0     methylphenidate (CONCERTA) 36 MG CR tablet Take 1 tablet (36 mg) by mouth every morning 30 tablet 0     propranolol (INDERAL) 20 MG tablet Take 1 tablet (20 mg) by mouth 2 times daily As needed for anxiety 60 tablet 1     Vitals                                                                                                                       3, 3   There were no vitals taken for this visit.   Mental Status Exam                                                                                    9, 14 cog gs     Alertness: alert  and oriented  Appearance: adequately groomed  Behavior/Demeanor: cooperative, pleasant and calm, with good  eye contact   Speech: normal  Language: no problems  Psychomotor: normal or unremarkable  Mood: \"pretty good\"  Affect: appropriate; was congruent to mood; was congruent to content  Thought Process/Associations: unremarkable  Thought Content:  Reports none;  Denies suicidal ideation, violent ideation, delusions and paranoid ideation  Perception:  Reports none;  Denies auditory " hallucinations and visual hallucinations  Insight: good  Judgment: good  Cognition: (6) does  appear grossly intact; formal cognitive testing was not done  Gait/Station and/or Muscle Strength/Tone: unable to assess    Labs and Data                                                                                                                 Rating Scales:    PHQ9    PHQ9 Today:  Not completed  PHQ-9 SCORE 6/7/2019 8/13/2019 11/12/2019   PHQ-9 Total Score 8 9 8         Diagnosis and Assessment                                                                             m2, h3     Today the following issues were addressed:    1) Major Depressive Disorder, recurrent, moderate  2) Generalized Anxiety Disorder  3) ADHD (Hx)     [place] Prescription Monitoring Program [] was checked today:  indicates filling controlled medications responsibly.           Plan                                                                                                                    m2, h3      1) Management of mood and anxiety  Therapy- Continue  Medications-   Continue Propranolol 20mg TID PRN for performance anxiety and periodic anxiety due to asthma diagnosis.  Asthma well controlled.  Uses very infrequently    2) Attention management  Medications-   Continue Concerta 36mg daily     RTC: 3 months  CRISIS NUMBERS:   Provided routinely in AVS.    Treatment Risk Statement:  The patient understands the risks, benefits, adverse effects and alternatives. Agrees to treatment with the capacity to do so. No medical contraindications to treatment. Agrees to call clinic for any problems. The patient understands to call 911 or go to the nearest ED if life threatening or urgent symptoms occur.     PROVIDER:  JOMAR Palm CNP

## 2021-08-31 NOTE — TELEPHONE ENCOUNTER
On August 31, 2021, at 3:45 PM, writer called patient at mobile to confirm Virtual Visit. Writer unable to make contact with patient. Writer left detailed voice message for call back. [phone] left as call back number. Yossi Bender, EMT

## 2021-08-31 NOTE — PATIENT INSTRUCTIONS
**For crisis resources, please see the information at the end of this document**     Patient Education      Thank you for coming to the [facility].    Lab Testing:  If you had lab testing today and your results are reassuring or normal they will be mailed to you or sent through [employer] within 7 days. If the lab tests need quick action we will call you with the results. The phone number we will call with results is # [phone] (home) . If this is not the best number please call our clinic and change the number.    Medication Refills:  If you need any refills please call your pharmacy and they will contact us. Our fax number for refills is [phone]. Please allow three business for refill processing. If you need to  your refill at a new pharmacy, please contact the new pharmacy directly. The new pharmacy will help you get your medications transferred.     Scheduling:  If you have any concerns about today's visit or wish to schedule another appointment please call our office during normal business hours [phone] (8-5:00 M-F)    Contact Us:  Please call [phone] during business hours (8-5:00 M-F).  If after clinic hours, or on the weekend, please call  [phone].    Financial Assistance [phone]  [facility] Billing [phone]  Central Billing Office, [facility]: [phone]  [facility]illing [phone]  Medical Records [phone]  [facility] Patient Bill of Rights [email or web address]       MENTAL HEALTH CRISIS NUMBERS:  For a medical emergency please call  911 or go to the nearest ER.     [facility]:   [facility] -[phone]   Crisis Residence [facility] -[phone]   Walk-In Counseling Center [facility] -[phone]   COPE 24/7 [place] Mobile Team -[phone] (adults)/[phone] (child)  CHILD: [facility] needs assessment  team - [phone]      [facility]:   [facility] - [phone]   Walk-in counseling [facility] - [phone]   Walk-in counseling [facility] - [phone]   Crisis Residence [facility] - [phone]  Urgent Care Adult [facility]-[phone] mobile unit/ 24/7 crisis line    National Crisis Numbers:   National Suicide Prevention Lifeline: 5-730-913-TALK (843-029-0513)  Poison Control Center - 7-736-605-1821  [place]/resources for a list of additional resources (SOS)  Trans Lifeline a hotline for transgender people 1-686.216.2565  [employer] a hotline for LGBT youth 9-012-525-1173  Crisis Text Line: For any crisis 24/7   To: 785611  see www.crisistextline.org  - IF MAKING A CALL FEELS TOO HARD, send a text!         Again thank you for choosing [facility] MENTAL HEALTH & ADDICTION [facility] and please let us know how we can best partner with you to improve you and your family's health.    You may be receiving a survey regarding this appointment. We would love to have your feedback, both positive and negative. The survey is done by an external company, so your answers are anonymous.

## 2021-10-18 ENCOUNTER — TELEPHONE (OUTPATIENT)
Dept: PSYCHIATRY | Facility: CLINIC | Age: 26
End: 2021-10-18

## 2021-10-18 NOTE — TELEPHONE ENCOUNTER
Vaccine administration information was received from [facility] pharmacy.   1.  Vaccine: influenza  2.  Date received: 10/15/2021  3.  Dose: 0.50mL  4.  Route: IM  5.  Location:   6.  : [employer]  7.  Lot #: [id number]  8.  Exp: 6/30/2021  This was sent to scanning with copy held until scanning confirmed.

## 2022-05-18 ENCOUNTER — TELEPHONE (OUTPATIENT)
Dept: PSYCHIATRY | Facility: CLINIC | Age: 27
End: 2022-05-18

## 2022-05-18 NOTE — TELEPHONE ENCOUNTER
On 5/17/2022 the patient signed an ERASTO authorizing the release of records from [facility] to [facility].  I faxed the document to medical records on 5/18/2022. .Oly Jiménez LPN

## 2023-11-10 NOTE — TELEPHONE ENCOUNTER
[facility] Call Center    Phone Message    May a detailed message be left on voicemail: yes     Reason for Call: Medication Question or concern regarding medication     Prescription Clarification and Forms    Pt is currently have a rough week; was diagnosed with COVID. Might need a DRC letter for his classes he didn't participate in due to depression symptoms.     Looking to increase his medication again while he is finishing school. Looking to go up to 100MG.         Action Taken: Patient transferred to: [facility] Screening: Not Applicable                                                                         Typical mixture would consist of diphenhydramine, lidocaine, nystatin

## 2024-09-12 DIAGNOSIS — R69 DIAGNOSIS UNKNOWN: Primary | ICD-10-CM

## 2025-01-12 ENCOUNTER — HEALTH MAINTENANCE LETTER (OUTPATIENT)
Age: 30
End: 2025-01-12

## 2025-01-15 ENCOUNTER — LAB (OUTPATIENT)
Dept: LAB | Facility: CLINIC | Age: 30
End: 2025-01-15
Payer: COMMERCIAL

## 2025-01-15 DIAGNOSIS — Z31.41 ENCOUNTER FOR SPERM COUNT FOR FERTILITY TESTING: ICD-10-CM

## 2025-01-15 PROCEDURE — 89322 SEMEN ANAL STRICT CRITERIA: CPT

## 2025-01-16 LAB
ABNORMAL SPERM MORPHOLOGY: 98
ABSTINENCE DAYS: 7 DAYS (ref 2–7)
AGGLUTINATION: NO
ANALYSIS TEMP - CENTIGRADE: 20 CENTIGRADE
COLLECTION METHOD: ABNORMAL
COLLECTION SITE: ABNORMAL
CONSENT TO RELEASE TO PARTNER: NO
DAL- RECEIVED TIME: ABNORMAL
HEAD DEFECT: 98 %
IMMOTILE: 30 %
LIQUEFIED: YES
MIDPIECE DEFECT: 23 %
NON-PROGRESSIVE MOTILITY: 2 %
NORMAL SPERM MORPHOLOGY: 2 % NORMAL FORMS
PROGRESSIVE MOTILITY: 68 %
ROUND CELLS: 0.9 MILLION/ML
SPECIMEN PH: 7.2 PH
SPECIMEN VOLUME: 5.8 ML
SPERM CONCENTRATION: 154.5 MILLION/ML
TAIL DEFECT: 21 %
TIME OF ANALYSIS: ABNORMAL
TOTAL PROGRESSIVE MOTILE NUMBER: 609 MILLION
TOTAL SPERM NUMBER: 896 MILLION
VISCOUS: NO
VITALITY: ABNORMAL